# Patient Record
Sex: MALE | Race: WHITE | NOT HISPANIC OR LATINO | Employment: OTHER | URBAN - METROPOLITAN AREA
[De-identification: names, ages, dates, MRNs, and addresses within clinical notes are randomized per-mention and may not be internally consistent; named-entity substitution may affect disease eponyms.]

---

## 2017-02-27 ENCOUNTER — TRANSCRIBE ORDERS (OUTPATIENT)
Dept: ADMINISTRATIVE | Facility: HOSPITAL | Age: 62
End: 2017-02-27

## 2017-02-27 ENCOUNTER — HOSPITAL ENCOUNTER (OUTPATIENT)
Dept: RADIOLOGY | Facility: HOSPITAL | Age: 62
Discharge: HOME | End: 2017-02-27
Attending: FAMILY MEDICINE
Payer: COMMERCIAL

## 2017-02-27 DIAGNOSIS — M79.645 PAIN OF FINGER OF LEFT HAND: Primary | ICD-10-CM

## 2017-02-27 DIAGNOSIS — M79.645 PAIN OF FINGER OF LEFT HAND: ICD-10-CM

## 2017-02-27 PROCEDURE — 73140 X-RAY EXAM OF FINGER(S): CPT

## 2017-02-28 ENCOUNTER — ALLSCRIPTS OFFICE VISIT (OUTPATIENT)
Dept: OTHER | Facility: OTHER | Age: 62
End: 2017-02-28

## 2017-02-28 ENCOUNTER — GENERIC CONVERSION - ENCOUNTER (OUTPATIENT)
Dept: OTHER | Facility: OTHER | Age: 62
End: 2017-02-28

## 2017-03-24 ENCOUNTER — GENERIC CONVERSION - ENCOUNTER (OUTPATIENT)
Dept: OTHER | Facility: OTHER | Age: 62
End: 2017-03-24

## 2017-03-27 ENCOUNTER — ANESTHESIA EVENT (OUTPATIENT)
Dept: PERIOP | Facility: HOSPITAL | Age: 62
End: 2017-03-27
Payer: COMMERCIAL

## 2017-03-27 RX ORDER — CARVEDILOL 3.12 MG/1
3.12 TABLET ORAL 2 TIMES DAILY WITH MEALS
COMMUNITY
End: 2020-07-14 | Stop reason: ALTCHOICE

## 2017-03-28 ENCOUNTER — HOSPITAL ENCOUNTER (OUTPATIENT)
Facility: HOSPITAL | Age: 62
Setting detail: OUTPATIENT SURGERY
Discharge: HOME | End: 2017-03-28
Attending: ORTHOPAEDIC SURGERY | Admitting: ORTHOPAEDIC SURGERY
Payer: COMMERCIAL

## 2017-03-28 ENCOUNTER — ANESTHESIA (OUTPATIENT)
Dept: PERIOP | Facility: HOSPITAL | Age: 62
End: 2017-03-28
Payer: COMMERCIAL

## 2017-03-28 ENCOUNTER — HOSPITAL ENCOUNTER (OUTPATIENT)
Dept: RADIOLOGY | Facility: HOSPITAL | Age: 62
Setting detail: OUTPATIENT SURGERY
Discharge: HOME | End: 2017-03-28
Payer: COMMERCIAL

## 2017-03-28 VITALS
HEIGHT: 69 IN | WEIGHT: 225 LBS | RESPIRATION RATE: 16 BRPM | BODY MASS INDEX: 33.33 KG/M2 | HEART RATE: 68 BPM | DIASTOLIC BLOOD PRESSURE: 80 MMHG | TEMPERATURE: 96.4 F | SYSTOLIC BLOOD PRESSURE: 151 MMHG | OXYGEN SATURATION: 95 %

## 2017-03-28 DIAGNOSIS — S62.615A: ICD-10-CM

## 2017-03-28 DIAGNOSIS — S62.615D: ICD-10-CM

## 2017-03-28 PROBLEM — S62.618A: Status: ACTIVE | Noted: 2017-03-28

## 2017-03-28 LAB
GLUCOSE SERPL-MCNC: 104 MG/DL (ref 65–140)
GLUCOSE SERPL-MCNC: 91 MG/DL (ref 65–140)

## 2017-03-28 PROCEDURE — C1713 ANCHOR/SCREW BN/BN,TIS/BN: HCPCS | Performed by: ORTHOPAEDIC SURGERY

## 2017-03-28 PROCEDURE — 82948 REAGENT STRIP/BLOOD GLUCOSE: CPT

## 2017-03-28 PROCEDURE — 73140 X-RAY EXAM OF FINGER(S): CPT

## 2017-03-28 DEVICE — 1.5MM CORTEX SCREW SLF-TPNG WITH T4 STARDRIVE RECESS 6MM: Type: IMPLANTABLE DEVICE | Site: FINGER | Status: FUNCTIONAL

## 2017-03-28 DEVICE — 1.5MM VAL STRUT PLATE 8 HOLES-OBLIQUE-LEFT: Type: IMPLANTABLE DEVICE | Site: FINGER | Status: FUNCTIONAL

## 2017-03-28 DEVICE — 1.5MM CORTEX SCREW SLF-TPNG WITH T4 STARDRIVE RECESS 8MM: Type: IMPLANTABLE DEVICE | Site: FINGER | Status: FUNCTIONAL

## 2017-03-28 RX ORDER — MIDAZOLAM HYDROCHLORIDE 1 MG/ML
INJECTION INTRAMUSCULAR; INTRAVENOUS AS NEEDED
Status: DISCONTINUED | OUTPATIENT
Start: 2017-03-28 | End: 2017-03-28 | Stop reason: SURG

## 2017-03-28 RX ORDER — EPHEDRINE SULFATE 50 MG/ML
INJECTION, SOLUTION INTRAVENOUS AS NEEDED
Status: DISCONTINUED | OUTPATIENT
Start: 2017-03-28 | End: 2017-03-28 | Stop reason: SURG

## 2017-03-28 RX ORDER — LIDOCAINE HYDROCHLORIDE 10 MG/ML
INJECTION, SOLUTION INFILTRATION; PERINEURAL AS NEEDED
Status: DISCONTINUED | OUTPATIENT
Start: 2017-03-28 | End: 2017-03-28 | Stop reason: SURG

## 2017-03-28 RX ORDER — OXYCODONE HYDROCHLORIDE 5 MG/1
10 TABLET ORAL EVERY 6 HOURS PRN
Qty: 30 TABLET | Refills: 0 | Status: SHIPPED | OUTPATIENT
Start: 2017-03-28 | End: 2017-04-07

## 2017-03-28 RX ORDER — SODIUM CHLORIDE, SODIUM LACTATE, POTASSIUM CHLORIDE, CALCIUM CHLORIDE 600; 310; 30; 20 MG/100ML; MG/100ML; MG/100ML; MG/100ML
125 INJECTION, SOLUTION INTRAVENOUS CONTINUOUS
Status: DISCONTINUED | OUTPATIENT
Start: 2017-03-28 | End: 2017-03-28 | Stop reason: HOSPADM

## 2017-03-28 RX ORDER — FENTANYL CITRATE 50 UG/ML
INJECTION, SOLUTION INTRAMUSCULAR; INTRAVENOUS AS NEEDED
Status: DISCONTINUED | OUTPATIENT
Start: 2017-03-28 | End: 2017-03-28 | Stop reason: SURG

## 2017-03-28 RX ORDER — FENTANYL CITRATE/PF 50 MCG/ML
25 SYRINGE (ML) INJECTION
Status: DISCONTINUED | OUTPATIENT
Start: 2017-03-28 | End: 2017-03-28 | Stop reason: HOSPADM

## 2017-03-28 RX ORDER — OXYCODONE HYDROCHLORIDE AND ACETAMINOPHEN 5; 325 MG/1; MG/1
2 TABLET ORAL EVERY 4 HOURS PRN
Status: DISCONTINUED | OUTPATIENT
Start: 2017-03-28 | End: 2017-03-28 | Stop reason: HOSPADM

## 2017-03-28 RX ORDER — PROPOFOL 10 MG/ML
INJECTION, EMULSION INTRAVENOUS AS NEEDED
Status: DISCONTINUED | OUTPATIENT
Start: 2017-03-28 | End: 2017-03-28 | Stop reason: SURG

## 2017-03-28 RX ORDER — NAPROXEN 500 MG/1
500 TABLET ORAL EVERY 12 HOURS PRN
COMMUNITY
End: 2020-07-08 | Stop reason: ALTCHOICE

## 2017-03-28 RX ORDER — OXYCODONE HYDROCHLORIDE 5 MG/1
5 TABLET ORAL EVERY 4 HOURS PRN
Status: ON HOLD | COMMUNITY
End: 2017-03-28

## 2017-03-28 RX ORDER — ONDANSETRON 2 MG/ML
INJECTION INTRAMUSCULAR; INTRAVENOUS AS NEEDED
Status: DISCONTINUED | OUTPATIENT
Start: 2017-03-28 | End: 2017-03-28 | Stop reason: SURG

## 2017-03-28 RX ORDER — MAGNESIUM HYDROXIDE 1200 MG/15ML
LIQUID ORAL AS NEEDED
Status: DISCONTINUED | OUTPATIENT
Start: 2017-03-28 | End: 2017-03-28 | Stop reason: HOSPADM

## 2017-03-28 RX ORDER — KETOROLAC TROMETHAMINE 30 MG/ML
INJECTION, SOLUTION INTRAMUSCULAR; INTRAVENOUS AS NEEDED
Status: DISCONTINUED | OUTPATIENT
Start: 2017-03-28 | End: 2017-03-28 | Stop reason: SURG

## 2017-03-28 RX ADMIN — SODIUM CHLORIDE, SODIUM LACTATE, POTASSIUM CHLORIDE, AND CALCIUM CHLORIDE 125 ML/HR: .6; .31; .03; .02 INJECTION, SOLUTION INTRAVENOUS at 12:27

## 2017-03-28 RX ADMIN — PROPOFOL 200 MG: 10 INJECTION, EMULSION INTRAVENOUS at 14:39

## 2017-03-28 RX ADMIN — ONDANSETRON 4 MG: 2 INJECTION INTRAMUSCULAR; INTRAVENOUS at 15:19

## 2017-03-28 RX ADMIN — FENTANYL CITRATE 50 MCG: 50 INJECTION, SOLUTION INTRAMUSCULAR; INTRAVENOUS at 15:31

## 2017-03-28 RX ADMIN — KETOROLAC TROMETHAMINE 30 MG: 30 INJECTION, SOLUTION INTRAMUSCULAR at 15:56

## 2017-03-28 RX ADMIN — LIDOCAINE HYDROCHLORIDE 100 MG: 10 INJECTION, SOLUTION INFILTRATION; PERINEURAL at 14:39

## 2017-03-28 RX ADMIN — EPHEDRINE SULFATE 5 MG: 50 INJECTION, SOLUTION INTRAMUSCULAR; INTRAVENOUS; SUBCUTANEOUS at 14:58

## 2017-03-28 RX ADMIN — EPHEDRINE SULFATE 5 MG: 50 INJECTION, SOLUTION INTRAMUSCULAR; INTRAVENOUS; SUBCUTANEOUS at 15:01

## 2017-03-28 RX ADMIN — FENTANYL CITRATE 50 MCG: 50 INJECTION, SOLUTION INTRAMUSCULAR; INTRAVENOUS at 14:43

## 2017-03-28 RX ADMIN — SODIUM CHLORIDE, SODIUM LACTATE, POTASSIUM CHLORIDE, AND CALCIUM CHLORIDE: .6; .31; .03; .02 INJECTION, SOLUTION INTRAVENOUS at 16:38

## 2017-03-28 RX ADMIN — MIDAZOLAM HYDROCHLORIDE 2 MG: 1 INJECTION, SOLUTION INTRAMUSCULAR; INTRAVENOUS at 14:32

## 2017-03-28 RX ADMIN — EPHEDRINE SULFATE 5 MG: 50 INJECTION, SOLUTION INTRAMUSCULAR; INTRAVENOUS; SUBCUTANEOUS at 15:04

## 2017-04-03 ENCOUNTER — TRANSCRIBE ORDERS (OUTPATIENT)
Dept: ADMINISTRATIVE | Facility: HOSPITAL | Age: 62
End: 2017-04-03

## 2017-04-03 ENCOUNTER — HOSPITAL ENCOUNTER (OUTPATIENT)
Dept: RADIOLOGY | Facility: HOSPITAL | Age: 62
Discharge: HOME | End: 2017-04-03
Attending: PODIATRIST
Payer: COMMERCIAL

## 2017-04-03 ENCOUNTER — LAB REQUISITION (OUTPATIENT)
Dept: LAB | Facility: HOSPITAL | Age: 62
End: 2017-04-03
Payer: COMMERCIAL

## 2017-04-03 ENCOUNTER — APPOINTMENT (OUTPATIENT)
Dept: OCCUPATIONAL THERAPY | Facility: CLINIC | Age: 62
End: 2017-04-03
Payer: COMMERCIAL

## 2017-04-03 DIAGNOSIS — L97.522 NON-PRESSURE CHRONIC ULCER OF OTHER PART OF LEFT FOOT WITH FAT LAYER EXPOSED (HCC): ICD-10-CM

## 2017-04-03 DIAGNOSIS — S62.615A: ICD-10-CM

## 2017-04-03 DIAGNOSIS — L03.032 CELLULITIS OF TOE OF LEFT FOOT: ICD-10-CM

## 2017-04-03 DIAGNOSIS — L97.529 TOE ULCER, LEFT, WITH UNSPECIFIED SEVERITY (HCC): Primary | ICD-10-CM

## 2017-04-03 DIAGNOSIS — L97.529 TOE ULCER, LEFT, WITH UNSPECIFIED SEVERITY (HCC): ICD-10-CM

## 2017-04-03 PROCEDURE — 97165 OT EVAL LOW COMPLEX 30 MIN: CPT

## 2017-04-03 PROCEDURE — 73660 X-RAY EXAM OF TOE(S): CPT

## 2017-04-03 PROCEDURE — G8990 OTHER PT/OT CURRENT STATUS: HCPCS

## 2017-04-03 PROCEDURE — 87070 CULTURE OTHR SPECIMN AEROBIC: CPT | Performed by: PODIATRIST

## 2017-04-03 PROCEDURE — 97110 THERAPEUTIC EXERCISES: CPT

## 2017-04-03 PROCEDURE — 87205 SMEAR GRAM STAIN: CPT | Performed by: PODIATRIST

## 2017-04-03 PROCEDURE — G8991 OTHER PT/OT GOAL STATUS: HCPCS

## 2017-04-05 LAB
BACTERIA WND AEROBE CULT: NORMAL
GRAM STN SPEC: NORMAL

## 2017-04-10 ENCOUNTER — APPOINTMENT (OUTPATIENT)
Dept: OCCUPATIONAL THERAPY | Facility: CLINIC | Age: 62
End: 2017-04-10
Payer: COMMERCIAL

## 2017-04-10 ENCOUNTER — HOSPITAL ENCOUNTER (OUTPATIENT)
Dept: RADIOLOGY | Facility: CLINIC | Age: 62
Discharge: HOME | End: 2017-04-10
Payer: COMMERCIAL

## 2017-04-10 ENCOUNTER — ALLSCRIPTS OFFICE VISIT (OUTPATIENT)
Dept: OTHER | Facility: OTHER | Age: 62
End: 2017-04-10

## 2017-04-10 DIAGNOSIS — S62.615D: ICD-10-CM

## 2017-04-10 PROCEDURE — 73140 X-RAY EXAM OF FINGER(S): CPT

## 2017-04-12 ENCOUNTER — ALLSCRIPTS OFFICE VISIT (OUTPATIENT)
Dept: WOUND CARE | Facility: HOSPITAL | Age: 62
End: 2017-04-12
Attending: PODIATRIST
Payer: COMMERCIAL

## 2017-04-12 PROCEDURE — 99213 OFFICE O/P EST LOW 20 MIN: CPT | Performed by: PODIATRIST

## 2017-04-12 PROCEDURE — G0463 HOSPITAL OUTPT CLINIC VISIT: HCPCS | Performed by: PODIATRIST

## 2017-04-12 PROCEDURE — 11042 DBRDMT SUBQ TIS 1ST 20SQCM/<: CPT | Performed by: PODIATRIST

## 2017-04-17 ENCOUNTER — GENERIC CONVERSION - ENCOUNTER (OUTPATIENT)
Dept: OTHER | Facility: OTHER | Age: 62
End: 2017-04-17

## 2017-04-17 ENCOUNTER — HOSPITAL ENCOUNTER (OUTPATIENT)
Dept: RADIOLOGY | Facility: HOSPITAL | Age: 62
Discharge: HOME | End: 2017-04-17
Attending: PODIATRIST
Payer: COMMERCIAL

## 2017-04-17 ENCOUNTER — HOSPITAL ENCOUNTER (OUTPATIENT)
Dept: NON INVASIVE DIAGNOSTICS | Facility: HOSPITAL | Age: 62
Discharge: HOME | End: 2017-04-17
Attending: PODIATRIST
Payer: COMMERCIAL

## 2017-04-17 ENCOUNTER — APPOINTMENT (OUTPATIENT)
Dept: LAB | Facility: HOSPITAL | Age: 62
End: 2017-04-17
Attending: PODIATRIST
Payer: COMMERCIAL

## 2017-04-17 ENCOUNTER — APPOINTMENT (OUTPATIENT)
Dept: PREADMISSION TESTING | Facility: HOSPITAL | Age: 62
End: 2017-04-17
Payer: COMMERCIAL

## 2017-04-17 VITALS
WEIGHT: 225 LBS | HEART RATE: 69 BPM | BODY MASS INDEX: 33.33 KG/M2 | DIASTOLIC BLOOD PRESSURE: 87 MMHG | HEIGHT: 69 IN | SYSTOLIC BLOOD PRESSURE: 137 MMHG

## 2017-04-17 DIAGNOSIS — L97.529 TOE ULCER, LEFT, WITH UNSPECIFIED SEVERITY (HCC): ICD-10-CM

## 2017-04-17 LAB
ANION GAP SERPL CALCULATED.3IONS-SCNC: 9 MMOL/L (ref 4–13)
ATRIAL RATE: 67 BPM
BASOPHILS # BLD AUTO: 0.03 THOUSANDS/ΜL (ref 0–0.1)
BASOPHILS NFR BLD AUTO: 0 % (ref 0–1)
BUN SERPL-MCNC: 18 MG/DL (ref 5–25)
CALCIUM SERPL-MCNC: 9.6 MG/DL (ref 8.3–10.1)
CHLORIDE SERPL-SCNC: 103 MMOL/L (ref 100–108)
CO2 SERPL-SCNC: 27 MMOL/L (ref 21–32)
CREAT SERPL-MCNC: 0.98 MG/DL (ref 0.6–1.3)
EOSINOPHIL # BLD AUTO: 0.41 THOUSAND/ΜL (ref 0–0.61)
EOSINOPHIL NFR BLD AUTO: 5 % (ref 0–6)
ERYTHROCYTE [DISTWIDTH] IN BLOOD BY AUTOMATED COUNT: 14.3 % (ref 11.6–15.1)
GFR SERPL CREATININE-BSD FRML MDRD: >60 ML/MIN/1.73SQ M
GLUCOSE SERPL-MCNC: 103 MG/DL (ref 65–140)
HCT VFR BLD AUTO: 42.2 % (ref 36.5–49.3)
HGB BLD-MCNC: 14.2 G/DL (ref 12–17)
LYMPHOCYTES # BLD AUTO: 2.16 THOUSANDS/ΜL (ref 0.6–4.47)
LYMPHOCYTES NFR BLD AUTO: 29 % (ref 14–44)
MCH RBC QN AUTO: 32.1 PG (ref 26.8–34.3)
MCHC RBC AUTO-ENTMCNC: 33.6 G/DL (ref 31.4–37.4)
MCV RBC AUTO: 96 FL (ref 82–98)
MONOCYTES # BLD AUTO: 0.71 THOUSAND/ΜL (ref 0.17–1.22)
MONOCYTES NFR BLD AUTO: 9 % (ref 4–12)
NEUTROPHILS # BLD AUTO: 4.26 THOUSANDS/ΜL (ref 1.85–7.62)
NEUTS SEG NFR BLD AUTO: 57 % (ref 43–75)
P AXIS: 42 DEGREES
PLATELET # BLD AUTO: 150 THOUSANDS/UL (ref 149–390)
PMV BLD AUTO: 9.4 FL (ref 8.9–12.7)
POTASSIUM SERPL-SCNC: 4.3 MMOL/L (ref 3.5–5.3)
PR INTERVAL: 174 MS
QRS AXIS: -5 DEGREES
QRSD INTERVAL: 166 MS
QT INTERVAL: 430 MS
QTC INTERVAL: 454 MS
RBC # BLD AUTO: 4.42 MILLION/UL (ref 3.88–5.62)
SODIUM SERPL-SCNC: 139 MMOL/L (ref 136–145)
T WAVE AXIS: 147 DEGREES
VENTRICULAR RATE: 67 BPM
WBC # BLD AUTO: 7.57 THOUSAND/UL (ref 4.31–10.16)

## 2017-04-17 PROCEDURE — 80048 BASIC METABOLIC PNL TOTAL CA: CPT

## 2017-04-17 PROCEDURE — 85025 COMPLETE CBC W/AUTO DIFF WBC: CPT

## 2017-04-17 PROCEDURE — 93005 ELECTROCARDIOGRAM TRACING: CPT

## 2017-04-17 PROCEDURE — 71020 HB CHEST X-RAY 2VW FRONTAL&LATL: CPT

## 2017-04-17 PROCEDURE — 36415 COLL VENOUS BLD VENIPUNCTURE: CPT

## 2017-04-17 RX ORDER — OMEPRAZOLE 20 MG/1
20 CAPSULE, DELAYED RELEASE ORAL DAILY
COMMUNITY
End: 2020-07-08 | Stop reason: ALTCHOICE

## 2017-04-17 RX ORDER — BIOTIN 1 MG
TABLET ORAL
COMMUNITY

## 2017-04-25 ENCOUNTER — ANESTHESIA EVENT (OUTPATIENT)
Dept: PERIOP | Facility: HOSPITAL | Age: 62
End: 2017-04-25
Payer: COMMERCIAL

## 2017-04-25 ENCOUNTER — APPOINTMENT (OUTPATIENT)
Dept: RADIOLOGY | Facility: HOSPITAL | Age: 62
End: 2017-04-25
Payer: COMMERCIAL

## 2017-04-25 ENCOUNTER — HOSPITAL ENCOUNTER (OUTPATIENT)
Facility: HOSPITAL | Age: 62
Setting detail: OUTPATIENT SURGERY
Discharge: HOME | End: 2017-04-25
Attending: PODIATRIST | Admitting: PODIATRIST
Payer: COMMERCIAL

## 2017-04-25 ENCOUNTER — ANESTHESIA (OUTPATIENT)
Dept: PERIOP | Facility: HOSPITAL | Age: 62
End: 2017-04-25
Payer: COMMERCIAL

## 2017-04-25 VITALS
RESPIRATION RATE: 20 BRPM | DIASTOLIC BLOOD PRESSURE: 66 MMHG | TEMPERATURE: 96 F | WEIGHT: 225 LBS | OXYGEN SATURATION: 96 % | HEIGHT: 69 IN | SYSTOLIC BLOOD PRESSURE: 130 MMHG | BODY MASS INDEX: 33.33 KG/M2 | HEART RATE: 57 BPM

## 2017-04-25 DIAGNOSIS — M86.172 OTHER ACUTE OSTEOMYELITIS, LEFT ANKLE AND FOOT (HCC): ICD-10-CM

## 2017-04-25 DIAGNOSIS — E11.40 TYPE 2 DIABETES MELLITUS WITH DIABETIC NEUROPATHY (HCC): ICD-10-CM

## 2017-04-25 PROCEDURE — 87176 TISSUE HOMOGENIZATION CULTR: CPT | Performed by: PODIATRIST

## 2017-04-25 PROCEDURE — 88305 TISSUE EXAM BY PATHOLOGIST: CPT | Performed by: PODIATRIST

## 2017-04-25 PROCEDURE — 87075 CULTR BACTERIA EXCEPT BLOOD: CPT | Performed by: PODIATRIST

## 2017-04-25 PROCEDURE — 87070 CULTURE OTHR SPECIMN AEROBIC: CPT | Performed by: PODIATRIST

## 2017-04-25 PROCEDURE — 87205 SMEAR GRAM STAIN: CPT | Performed by: PODIATRIST

## 2017-04-25 PROCEDURE — 73630 X-RAY EXAM OF FOOT: CPT

## 2017-04-25 PROCEDURE — 88311 DECALCIFY TISSUE: CPT | Performed by: PODIATRIST

## 2017-04-25 RX ORDER — PROPOFOL 10 MG/ML
INJECTION, EMULSION INTRAVENOUS CONTINUOUS PRN
Status: DISCONTINUED | OUTPATIENT
Start: 2017-04-25 | End: 2017-04-25 | Stop reason: SURG

## 2017-04-25 RX ORDER — FENTANYL CITRATE 50 UG/ML
INJECTION, SOLUTION INTRAMUSCULAR; INTRAVENOUS AS NEEDED
Status: DISCONTINUED | OUTPATIENT
Start: 2017-04-25 | End: 2017-04-25 | Stop reason: SURG

## 2017-04-25 RX ORDER — PROPOFOL 10 MG/ML
INJECTION, EMULSION INTRAVENOUS AS NEEDED
Status: DISCONTINUED | OUTPATIENT
Start: 2017-04-25 | End: 2017-04-25 | Stop reason: SURG

## 2017-04-25 RX ORDER — SODIUM CHLORIDE, SODIUM LACTATE, POTASSIUM CHLORIDE, CALCIUM CHLORIDE 600; 310; 30; 20 MG/100ML; MG/100ML; MG/100ML; MG/100ML
20 INJECTION, SOLUTION INTRAVENOUS CONTINUOUS
Status: DISCONTINUED | OUTPATIENT
Start: 2017-04-25 | End: 2017-04-25 | Stop reason: HOSPADM

## 2017-04-25 RX ORDER — CLINDAMYCIN PHOSPHATE 900 MG/50ML
900 INJECTION INTRAVENOUS ONCE
Status: COMPLETED | OUTPATIENT
Start: 2017-04-25 | End: 2017-04-25

## 2017-04-25 RX ORDER — KETAMINE HYDROCHLORIDE 50 MG/ML
INJECTION, SOLUTION, CONCENTRATE INTRAMUSCULAR; INTRAVENOUS AS NEEDED
Status: DISCONTINUED | OUTPATIENT
Start: 2017-04-25 | End: 2017-04-25 | Stop reason: SURG

## 2017-04-25 RX ORDER — FENTANYL CITRATE/PF 50 MCG/ML
25 SYRINGE (ML) INJECTION
Status: DISCONTINUED | OUTPATIENT
Start: 2017-04-25 | End: 2017-04-25 | Stop reason: HOSPADM

## 2017-04-25 RX ORDER — ONDANSETRON 2 MG/ML
4 INJECTION INTRAMUSCULAR; INTRAVENOUS ONCE
Status: DISCONTINUED | OUTPATIENT
Start: 2017-04-25 | End: 2017-04-25 | Stop reason: HOSPADM

## 2017-04-25 RX ORDER — OXYCODONE HYDROCHLORIDE AND ACETAMINOPHEN 5; 325 MG/1; MG/1
1 TABLET ORAL EVERY 4 HOURS PRN
Qty: 12 TABLET | Refills: 0 | Status: SHIPPED | OUTPATIENT
Start: 2017-04-25 | End: 2017-05-05

## 2017-04-25 RX ORDER — OXYCODONE HYDROCHLORIDE 5 MG/1
5 TABLET ORAL EVERY 4 HOURS PRN
Status: DISCONTINUED | OUTPATIENT
Start: 2017-04-25 | End: 2017-04-25 | Stop reason: HOSPADM

## 2017-04-25 RX ORDER — ONDANSETRON 2 MG/ML
INJECTION INTRAMUSCULAR; INTRAVENOUS AS NEEDED
Status: DISCONTINUED | OUTPATIENT
Start: 2017-04-25 | End: 2017-04-25 | Stop reason: SURG

## 2017-04-25 RX ORDER — KETOROLAC TROMETHAMINE 30 MG/ML
INJECTION, SOLUTION INTRAMUSCULAR; INTRAVENOUS AS NEEDED
Status: DISCONTINUED | OUTPATIENT
Start: 2017-04-25 | End: 2017-04-25 | Stop reason: SURG

## 2017-04-25 RX ORDER — MIDAZOLAM HYDROCHLORIDE 1 MG/ML
INJECTION INTRAMUSCULAR; INTRAVENOUS AS NEEDED
Status: DISCONTINUED | OUTPATIENT
Start: 2017-04-25 | End: 2017-04-25 | Stop reason: SURG

## 2017-04-25 RX ORDER — ALBUTEROL SULFATE 2.5 MG/3ML
2.5 SOLUTION RESPIRATORY (INHALATION) ONCE
Status: COMPLETED | OUTPATIENT
Start: 2017-04-25 | End: 2017-04-25

## 2017-04-25 RX ADMIN — SODIUM CHLORIDE, SODIUM LACTATE, POTASSIUM CHLORIDE, AND CALCIUM CHLORIDE 20 ML/HR: .6; .31; .03; .02 INJECTION, SOLUTION INTRAVENOUS at 07:22

## 2017-04-25 RX ADMIN — KETAMINE HYDROCHLORIDE 20 MG: 50 INJECTION INTRAMUSCULAR; INTRAVENOUS at 08:05

## 2017-04-25 RX ADMIN — ALBUTEROL SULFATE 2.5 MG: 2.5 SOLUTION RESPIRATORY (INHALATION) at 08:56

## 2017-04-25 RX ADMIN — MIDAZOLAM HYDROCHLORIDE 4 MG: 1 INJECTION, SOLUTION INTRAMUSCULAR; INTRAVENOUS at 08:05

## 2017-04-25 RX ADMIN — KETOROLAC TROMETHAMINE 15 MG: 30 INJECTION, SOLUTION INTRAMUSCULAR at 08:20

## 2017-04-25 RX ADMIN — FENTANYL CITRATE 100 MCG: 50 INJECTION, SOLUTION INTRAMUSCULAR; INTRAVENOUS at 08:05

## 2017-04-25 RX ADMIN — CLINDAMYCIN PHOSPHATE 900 MG: 18 INJECTION, SOLUTION INTRAMUSCULAR; INTRAVENOUS at 08:00

## 2017-04-25 RX ADMIN — ONDANSETRON 4 MG: 2 INJECTION INTRAMUSCULAR; INTRAVENOUS at 08:10

## 2017-04-25 RX ADMIN — PROPOFOL 60 MCG/KG/MIN: 10 INJECTION, EMULSION INTRAVENOUS at 08:05

## 2017-04-25 RX ADMIN — SODIUM CHLORIDE, SODIUM LACTATE, POTASSIUM CHLORIDE, AND CALCIUM CHLORIDE: .6; .31; .03; .02 INJECTION, SOLUTION INTRAVENOUS at 07:53

## 2017-04-25 RX ADMIN — PROPOFOL 30 MG: 10 INJECTION, EMULSION INTRAVENOUS at 08:05

## 2017-04-28 LAB
BACTERIA SPEC ANAEROBE CULT: NO GROWTH
BACTERIA TISS AEROBE CULT: NO GROWTH
BACTERIA WND AEROBE CULT: NO GROWTH
GRAM STN SPEC: NORMAL
GRAM STN SPEC: NORMAL

## 2017-06-28 ENCOUNTER — ALLSCRIPTS OFFICE VISIT (OUTPATIENT)
Dept: OTHER | Facility: OTHER | Age: 62
End: 2017-06-28

## 2017-10-23 ENCOUNTER — GENERIC CONVERSION - ENCOUNTER (OUTPATIENT)
Dept: OTHER | Facility: OTHER | Age: 62
End: 2017-10-23

## 2017-10-24 ENCOUNTER — GENERIC CONVERSION - ENCOUNTER (OUTPATIENT)
Dept: OTHER | Facility: OTHER | Age: 62
End: 2017-10-24

## 2018-01-13 VITALS
HEART RATE: 70 BPM | DIASTOLIC BLOOD PRESSURE: 84 MMHG | BODY MASS INDEX: 31.15 KG/M2 | WEIGHT: 230 LBS | SYSTOLIC BLOOD PRESSURE: 142 MMHG | HEIGHT: 72 IN

## 2018-01-13 VITALS
BODY MASS INDEX: 31.36 KG/M2 | WEIGHT: 231.5 LBS | HEIGHT: 72 IN | DIASTOLIC BLOOD PRESSURE: 82 MMHG | HEART RATE: 67 BPM | SYSTOLIC BLOOD PRESSURE: 138 MMHG

## 2018-01-17 NOTE — PROGRESS NOTES
Preliminary Nursing Report                Patient Information    Initial Encounter Entry Date:   3/24/2017 11:33 AM EST (Automated Transmission Automated Transmission)       Last Modified:   {Adrián Wilkins}              Legal Name: Allison Huntley        Social Security Number:        YOB: 1955        Age (years): 59        Gender: M        Body Mass Index (BMI): 31 kg/m2        Height: 72 in  Weight: 227 lbs (103 kgs)           Address:   [place] [address]  [id number] [place]              Phone: [phone]   (consent to leave messages)        Email:        Ethnicity: Decline to State        [employer]:        Marital Status:        Preferred Language: English        Race: Other Race                    Patient Insurance Information        Primary Insurance Information Carrier Name: {Primary  CarrierName}           Carrier Address:   {Primary  CarrierAddress}              Carrier Phone: {Primary  CarrierPhone}          Group Number: {Primary  GroupNumber}          Policy Number: {Primary  PolicyNumber}          Insured Name: {Primary  InsuredName}          Insured : {Primary  InsuredDOB}          Relationship to Insured: {Primary  RelationshiptoInsured}           Secondary Insurance Information Carrier Name: {Secondary  CarrierName}           Carrier Address:   {Secondary  CarrierAddress}              Carrier Phone: {Secondary  CarrierPhone}          Group Number: {Secondary  GroupNumber}          Policy Number: {Secondary  PolicyNumber}          Insured Name: {Secondary  InsuredName}          Insured : {Secondary  InsuredDOB}          Relationship to Insured: {Secondary  RelationshiptoInsured}                       Health Profile   Booking #:   Sulema Mann #: [id number]               DOS: 2017    Surgery : REPAIR FINGER FRACTURE, EACH    Add'l Procedures/Notes:     Surgery Risk: Minor          Precautions     Chest pain       Dilated cardiomyopathy       Nonischemic cardiomyopathy                   Allergies    No Known Environmental Allergies       No Known Food Allergies       Clinical Comments: Reaction Type: , Reaction: , Severity:        Robaxin TABS       Clinical Comments: Reaction Type: , Reaction: , Severity:              Medications    Amitriptyline HCl - 100 MG Oral Tablet       Aspirin 81 MG TABS       Carvedilol 3 125 MG Oral Tablet       Clotrimazole-Betamethasone 1-0 05 % External Cream       Diclofenac Potassium 50 MG Oral Tablet       Folic Acid 1 MG Oral Tablet       Levothyroxine Sodium 50 MCG Oral Tablet       Lisinopril 20 MG Oral Tablet       MetFORMIN HCl ER (OSM) 1000 MG Oral Tablet Extended Release 24 Hour       Naproxen 500 MG Oral Tablet       Omeprazole 20 MG CPCR       Simvastatin 10 MG Oral Tablet       Vitamin B12 1000 MCG Oral Tablet Extended Release       Vitamin D 2000 UNIT Oral Tablet               Conditions    Abnormal ECG       Abnormal stress test       Benign essential hypertension       Bursitis of right hip       Chest pain       Chronic pain syndrome       Closed displaced fracture of proximal phalanx of left ring finger, initial encounter       Deep Pain In The Right Hip       Dilated cardiomyopathy       Hyperlipemia       Lower back pain       Nonischemic cardiomyopathy       Pain in joint of right hip       Right knee pain       Sacral Radiculopathy At S1       Spinal stenosis       Spondylosis of 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               Family History    None             Surgical History    None             Social History    Alcohol drinker       Current Every Day Smoker                               Patient Instructions       Medical Procedure Risk  NPO Instructions   The day before surgery it is recommended to have a light dinner at your usual time and you are allowed a light snack early in the evening   Do not eat anything heavy or eat a big meal after 7pm  Do not eat or drink anything after midnight prior to your surgery  If you are supposed to take any of your medications, do so with a sip of water  Failure to follow these instructions can lead to an increased risk of lung complications and may result in a delay or cancellation of your procedure  If you have any questions, contact your institution for further instructions  No candy, no gum, no mints, no chewing tobacco          Lisinopril 20 MG Oral Tablet  Medication Instruction (ACE/ARB - Blood Pressure Medication) 1  Please continue the following medications up to the evening before surgery, but do not take it on the day of surgery  Please restart your medications as soon as clinically feasible  Aspirin 81 MG TABS  Medication Instruction (Aspirin - Blood Thinners) 112, 104, 105, 114, 113, 103, 110, 107, 108, 109, 111, 106  Please continue to take this medication on your normal schedule  If this is an oral medication and you take in the morning, you may do so with a sip of water  However, your surgeon may have you stop aspirin up to a week early if you are having intracranial, middle ear, posterior eye, spine surgery or prostate surgery  Carvedilol 3 125 MG Oral Tablet  Medication Instruction (Beta Blocker) 3, 2, c, 4, 6, 5  Please continue to take this medication on your normal schedule  If this is an oral medication and you take in the morning, you may do so with a sip of water  MetFORMIN HCl ER (OSM) 1000 MG Oral Tablet Extended Release 24 Hour  Medication Instruction (Diabetic Medication)   Please decrease your morning insulin dose to one-half of normal dose  If you are taking oral diabetes medications, the morning dose should be omitted  If taking metformin (Glucophage), discontinue the medication for 24 hours prior to surgery  If you have an insulin pump, continue at a basal rate only           Diclofenac Potassium 50 MG Oral Tablet, Naproxen 500 MG Oral Tablet  Medication Instruction (NSAID - Pain Medication) 61  Please stop ibuprofen, naproxen and other non-steroidal anti-inflammatory drugs (NSAIDS) for 24 hrs before surgery  Simvastatin 10 MG Oral Tablet  Medication Instruction (Cholesterol Medication) 81, 82  Please continue to take this medication on your normal schedule  If this is an oral medication and you take in the morning, you may do so with a sip of water  Amitriptyline HCl - 100 MG Oral Tablet  Medication Instruction (TCA - Antidepressants) 87, 88  Please continue to take this medication on your normal schedule  If this is an oral medication and you take in the morning, you may do so with a sip of water  Current Every Day Smoker  Smoking Cessation   Smoking before and after surgery can lead to complications  Patients who quit smoking at least eight weeks before surgery have complication rates almost as low as non-smokers  Smokers who can stop smoking 24 or 48 hours before surgery may also benefit from decreased amounts of nicotine and carbon monoxide in the body  For help quitting smoking, speak with your physician or contact the [address] [place] [address] or [employer]  Please visit the following web address for assistance with quitting  [employer] be  [email or web address]  aspx  Testing Considerations       No recommendations for this classification  Consultations       ? Cardiac Consult (Major CHF)   If the patient is having increasing difficulty breathing or fluid retention then a cardiac consult is indicated  Otherwise, optimization of medical therapy is recommended under the direction of the PCP or Cardiologist   Triggered by: Nonischemic cardiomyopathy, Dilated cardiomyopathy         ? Cardiac Consult (Major MI) c  If the patient has had a Myocardial Infarction within 30 days then a cardiac consult is indicated   Also, if the patient is having increasing frequency or intensity of chest pain then a cardiac consult is indicated  Otherwise, optimization of medical therapy is recommended under the direction of the PCP or cardiologist   Triggered by: Chest pain               Miscellaneous Questions         Question: Are you able to walk up a flight of stairs, walk up a hill or do heavy housework WITHOUT having chest pain or shortness of breath? Answer: YES                   Allergies/Conditions/Medications Not Found        The following were not recognized by our system when generating the recommendations  Please consider if this would impact any preoperative protocols  ? Abnormal stress test       ? Alcohol drinker       ? Deep Pain In The Right Hip       ? Sacral Radiculopathy At S1       ? Clotrimazole-Betamethasone 1-0 05 % External Cream       ? Folic Acid 1 MG Oral Tablet       ? Levothyroxine Sodium 50 MCG Oral Tablet       ? Omeprazole 20 MG CPCR       ? Vitamin B12 1000 MCG Oral Tablet Extended Release                  Appointment Information         Date:    03/28/2017        Location:    [place]        Address:           Directions:                      Footnotes revision 14      ?? Denotes a free-text entry  Legal Disclaimer: Any and all recommendations and services provided herein are designed to assist in the preoperative care of the patient  Nothing contained herein is designed to replace, eliminate or alleviate the responsibility of the attending physician to supervise and determine the patient?s preoperative care and course of treatment  Failure to provide complete, accurate information may negatively impact the system?s ability to recommend the proper preoperative protocol  THE ATTENDING PHYSICIAN IS RESPONSIBLE TO REVIEW THE SUGGESTED PREOPERATIVE PROTOCOLS/COURSE OF TREATMENT AND PRESCRIBE THE FINAL COURSE OF PREOPERATIVE TREATMENT IN CONSULTATION WITH THE PATIENT  THE [employer] SYSTEM AND ITS MATERIALS ARE PROVIDED ? AS IS? WITHOUT WARRANTY OF ANY KIND, EXPRESS OR IMPLIED, INCLUDING, BUT NOT LIMITED TO, WARRANTIES OF PERFORMANCE OR MERCHANTABILITY OR FITNESS FOR A PARTICULAR PURPOSE  PATIENT AND PHYSICIANS HEREBY AGREE THAT THEIR USE OF THE MATERIALS AND RESOURCES ACT AS A CONSENT TO RELEASE AND WAIVE ePREOP FROM ANY AND ALL CLAIMS OF WARRANTY, TORT OR CONTRACT LAW OF ANY KIND  Electronically signed Jarett CORREIA    Mar 24 2017 12:49PM EST

## 2018-01-22 VITALS
DIASTOLIC BLOOD PRESSURE: 74 MMHG | HEIGHT: 72 IN | SYSTOLIC BLOOD PRESSURE: 127 MMHG | WEIGHT: 227 LBS | HEART RATE: 80 BPM | BODY MASS INDEX: 30.75 KG/M2

## 2018-01-23 NOTE — PROGRESS NOTES
Assessment    1  Closed displaced fracture of proximal phalanx of left ring finger, initial encounter (816 01)   (P97 046T)    Plan  Closed displaced fracture of proximal phalanx of left ring finger, initial encounter    · 1 - Sophia Foley MD  (Orthopedic Surgery) Physician Referral  Consult Only: the  expectation is that the referring provider will communicate back to the patient on  treatment options  Evaluation and Treatment: the expectation is that the referred to  provider will communicate back to the patient on treatment options  Status: Active   Requested for: 03Avj0526  Care Summary provided  : Yes    Discussion/Summary    Findings consistent with left ring finger proximal phalanx fracture with rotational deformity  Discussed findings and treatment options with patient  Will schedule surgery with Dr Phyllis Pan to fix his finger  Contact Dr hPyllis Pan and agree to set up his surgery for 3/7/17  Chief Complaint  left ring finger injury      History of Present Illness  HPI: 58 yo white male with injury to his left ring finger 2/21/17  His left ring finger was caught by his dog leash and twisted  He is c/o pain of the left ring finger with difficulty moving  He also noticed deformity when trying to bend his finger  The ring finger is overlapping the little finger  Review of Systems    Constitutional: No fever or chills, feels well, no tiredness, no recent weight loss or weight gain  Eyes: No complaints of red eyes, no eyesight problems  ENT: no complaints of loss of hearing, no nosebleeds, no sore throat  Cardiovascular: No complaints of chest pain, no palpitations, no leg claudication or lower extremity edema  Respiratory: No complaints of shortness of breath, no wheezing, no cough  Gastrointestinal: No complaints of abdominal pain, no constipation, no nausea or vomiting, no diarrhea or bloody stools  Genitourinary: No complaints of dysuria or incontinence, no hesitancy, no nocturia  Musculoskeletal: as noted in HPI  Integumentary: No complaints of skin rash or lesion, no itching or dry skin, no skin wounds  Neurological: No complaints of headache, no confusion, no numbness or tingling, no dizziness  Psychiatric: No suicidal thoughts, no anxiety, no depression  Endocrine: No muscle weakness, no frequent urination, no excessive thirst, no feelings of weakness  ROS reviewed  Active Problems    1  Abnormal ECG (794 31) (R94 31)   2  Abnormal stress test (794 39) (R94 39)   3  Benign essential hypertension (401 1) (I10)   4  Bursitis of right hip (726 5) (M70 71)   5  Chest pain (786 50) (R07 9)   6  Chronic pain syndrome (338 4) (G89 4)   7  Deep Pain In The Right Hip (719 45)   8  Dilated cardiomyopathy (425 4) (I42 0)   9  Hyperlipemia (272 4) (E78 5)   10  Lower back pain (724 2) (M54 5)   11  Nonischemic cardiomyopathy (425 4) (I42 8)   12  Pain in joint of right hip (719 45) (M25 551)   13  Right knee pain (719 46) (M25 561)   14  Sacral Radiculopathy At S1 (724 4)   15  Spinal stenosis (724 00) (M48 00)   16  Spondylosis of 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 (721 3) (M47 816)    Past Medical History    · History of arthritis (V13 4) (Z87 39)   · History of deep venous thrombosis (V12 51) (Z86 718)   · History of type 2 diabetes mellitus (V12 29) (Z86 39)    The active problems and past medical history were reviewed and updated today  Surgical History    · History of Back Surgery   · History of Hernia Repair   · History of Knee Replacement   · History of Laryngotomy With Removal Of Tumor   · History of Total Hip Replacement    The surgical history was reviewed and updated today  Family History    The family history was reviewed and updated today  Social History    · Alcohol drinker (V49 89) (Z78 9)   · Current Every Day Smoker (305 1)  The social history was reviewed and updated today  Current Meds   1   Amitriptyline HCl - 100 MG Oral Tablet; TAKE 2 TABLETS AT BEDTIME; Therapy: (Recorded:71Ygx5695) to Recorded   2  Aspirin 81 MG TABS; Take 1 tablet daily; Therapy: (Noe Blancas) to Recorded   3  Carvedilol 3 125 MG Oral Tablet; TAKE ONE TABLET BY MOUTH TWICE DAILY WITH   MEALS; Therapy: 33WXQ8967 to (Keke Torres)  Requested for: 85KWD8111; Last   Rx:65Bcq9045 Ordered   4  Clotrimazole-Betamethasone 1-0 05 % External Cream;   Therapy: (Recorded:26Zdk2954) to Recorded   5  Diclofenac Potassium 50 MG Oral Tablet; Therapy: (Dionicio Salgado) to Recorded   6  Folic Acid 1 MG Oral Tablet; Take 1 tablet daily; Therapy: (Noe Blancas) to Recorded   7  Levothyroxine Sodium 50 MCG Oral Tablet; Therapy: 02CZH5808 to (Last XC:71NUC0729)  Requested for: 64MJR5532 Ordered   8  Lisinopril 20 MG Oral Tablet; TAKE 1 TABLET DAILY; Therapy: 95VBH6339 to (Evaluate:82Cfy6674)  Requested for: 26SAA0030 Recorded   9  MetFORMIN HCl ER (OSM) 1000 MG Oral Tablet Extended Release 24 Hour; take 2   tablet daily; Therapy: (Noe Blancas) to Recorded   10  Naproxen 500 MG Oral Tablet; TAKE 1 TABLET EVERY 12 HOURS AS NEEDED; Therapy: 27QVJ4500 to Recorded   11  Omeprazole 20 MG CPCR; TAKE 1 CAPSULE DAILY; Therapy: (Recorded:41Avj3333) to Recorded   12  Simvastatin 10 MG Oral Tablet; TAKE 1 TABLET DAILY AT BEDTIME; Therapy: (Recorded:79Mrj5820) to Recorded   13  Vitamin B12 1000 MCG Oral Tablet Extended Release; TAKE 2 TABLET Daily TDD:2,000    mg; Therapy: (Noe Blancas) to Recorded   14  Vitamin D 2000 UNIT Oral Tablet; take 2 tablet daily; Therapy: (Recorded:43Fik0406) to Recorded    The medication list was reviewed and updated today  Allergies    1  Robaxin TABS    2  No Known Environmental Allergies   3   No Known Food Allergies    Vitals   Recorded: 88UTN1114 03:10PM   Heart Rate 80   Systolic 070   Diastolic 74   Height 6 ft    Weight 227 lb    BMI Calculated 30 79   BSA Calculated 2 25     Physical Exam    Constitutional - General appearance: Normal    Musculoskeletal - Gait and station: Normal    Cardiovascular - Heart - RRR, no murmurs, no rubs, no gallops  Respiratory - Lungs - Clear to auscultation bilaterally, no rales, no rhonci, no wheezes  Neurologic - Sensation: Normal  Upper extremity peripheral neuro exam: Normal    Psychiatric - Orientation to person, place, and time: Normal  Mood and affect: Normal    Eyes   Conjunctiva and lids: Normal     Left Fourth Digit/Hand: Appearance: swelling of the 4th phalanx, swelling of the 4th PIP joint and rotational malalignment of the 4th digit (4th and proximal phalanx ), but no Boutonniere deformity of the 4th digit and no 4th digit Mallet deformity  Tenderness: 4th phalanx (4th and proximal phalanx )  PIP flexion: painful restricted AROM  PIP extension: normal AROM  Motor: Deferred  Results/Data  I personally reviewed the films/images/results in the office today  My interpretation follows  X-ray Review Left ring finger showed spiral proximal phalanx fracture  Surgery Scheduling Form  Surgery Schedule Form [facility] Standard:   Location: [place]   Confirmation Number:   PROCEDURE DETAILS   Procedure Date: 3/7/17   Requested Time:   Surgeon: Dr Meryl Sanderson   Co-Surgeon:   ALEYDA ELDER [facility] Required:   Bed: Out Patient - No Bed Required   Procedure: left ring finger ORIF   Laterality/Level: Left  Case Length:   Anticipated frozen section:   Anesthesia: Local     Procedure Codes:   Pre-op diagnosis: left closed displaced fracture of proximal phalanx of ring finger   Diagnosis Code(s): S62 615A   Equipment: C-Arm/Xray   Equipment Needs:   Implants:     Is the patient able to walk up a flight of stairs, walk up a hill or do heavy housework WITHOUT having chest pain or shortness of breath?    REGISTRATION & FINANCIAL CLEARANCE   FA Initials:   Insurance:   Policy Number: Group Number:     PRE-ADMISSION TESTING/CLINICAL INFORMATION   PAT Location: [facility] CONSULTS NEEDED:   Anesthesia Consult: No Anesthesia consult needed   Medical Consult: No Medical consult needed   Cardiac Consult: No Cardiac consult needed   Other Consult: No Other consult needed    ALLERGIES AND ALERTS     Latex Allergy: NO   Penicillin Allergy: NO   Malignant Hyperthermia: NO   Diabetic Patient: YES     COMMENTS   Scheduling Information Provided By:     CASE MANAGEMENT:      Future Appointments    Date/Time Provider Specialty Site   03/07/2017 02:45 PM MARC Frank  [address] [place] [address] OR   03/24/2017 08:50 AM MARC Frank   Orthopedic Surgery [address]     Signatures   Electronically signed by : Mo Castellano [facility]; Feb 28 2017  3:52PM EST                       (Author)    Electronically signed by : Cory Glasgow MD; Feb 28 2017  4:34PM EST                       (Author)

## 2018-10-15 LAB — HBA1C MFR BLD HPLC: 6.3 %

## 2019-01-08 ENCOUNTER — TELEPHONE (OUTPATIENT)
Dept: PAIN MEDICINE | Facility: MEDICAL CENTER | Age: 64
End: 2019-01-08

## 2019-01-08 NOTE — TELEPHONE ENCOUNTER
S/w patient and spouse, pt was last seen in [address] [place] 9/13/13 by ANDRZEJ    Bursitis Of The Right Hip   Chronic Pain Syndrome   Deep Pain In The Right Hip  Joint Pain In The Right Knee   Lower Back Pain Right  Lumbar Spondylosis     Per pt can hardly walk, has [facility]-PP[facility]  No recent imaging, if np pw is not rec'd by 1/11/19 spouse will come to [address]  office to  pw    Spoke with pharmacy requires prior authorization     Patient advised that they should receive the NP paperwork in the mail prior to their appointment  If they do not receive the paperwork; or if the appointment is within 3-7 days they can print it off the spine and pain website:  [employer] or Arrive 45 minutes prior to their appointment time, or retrieve it from the practice in advance to their appointment  It will take approximately 30 minutes to complete

## 2019-01-18 ENCOUNTER — OFFICE VISIT (OUTPATIENT)
Dept: PAIN MEDICINE | Facility: CLINIC | Age: 64
End: 2019-01-18
Payer: COMMERCIAL

## 2019-01-18 VITALS
HEIGHT: 69 IN | WEIGHT: 224 LBS | SYSTOLIC BLOOD PRESSURE: 160 MMHG | BODY MASS INDEX: 33.18 KG/M2 | HEART RATE: 94 BPM | DIASTOLIC BLOOD PRESSURE: 106 MMHG

## 2019-01-18 DIAGNOSIS — M46.1 SACROILIITIS (HCC): ICD-10-CM

## 2019-01-18 DIAGNOSIS — M25.562 CHRONIC PAIN OF LEFT KNEE: ICD-10-CM

## 2019-01-18 DIAGNOSIS — G89.29 CHRONIC PAIN OF LEFT KNEE: ICD-10-CM

## 2019-01-18 DIAGNOSIS — M54.16 LEFT LUMBAR RADICULITIS: ICD-10-CM

## 2019-01-18 DIAGNOSIS — M25.552 PAIN OF LEFT HIP JOINT: Primary | ICD-10-CM

## 2019-01-18 PROBLEM — M86.9 PYOGENIC INFLAMMATION OF BONE (HCC): Status: ACTIVE | Noted: 2017-04-12

## 2019-01-18 PROBLEM — I44.7 LBBB (LEFT BUNDLE BRANCH BLOCK): Status: ACTIVE | Noted: 2017-06-28

## 2019-01-18 PROBLEM — I25.10 ARTERIOSCLEROSIS OF CORONARY ARTERY: Status: ACTIVE | Noted: 2017-06-28

## 2019-01-18 PROBLEM — L97.522 SKIN ULCER OF TOE OF LEFT FOOT WITH FAT LAYER EXPOSED (HCC): Status: ACTIVE | Noted: 2017-04-12

## 2019-01-18 PROBLEM — E11.49 DIABETES MELLITUS TYPE 2 WITH NEUROLOGICAL MANIFESTATIONS (HCC): Status: ACTIVE | Noted: 2017-04-12

## 2019-01-18 PROCEDURE — 99204 OFFICE O/P NEW MOD 45 MIN: CPT | Performed by: ANESTHESIOLOGY

## 2019-01-18 RX ORDER — HYDROCODONE BITARTRATE AND ACETAMINOPHEN 5; 325 MG/1; MG/1
TABLET ORAL
COMMUNITY
Start: 2019-01-11 | End: 2019-06-14 | Stop reason: ALTCHOICE

## 2019-01-18 RX ORDER — DULOXETIN HYDROCHLORIDE 60 MG/1
CAPSULE, DELAYED RELEASE ORAL
COMMUNITY
Start: 2019-01-11

## 2019-01-18 RX ORDER — DICLOFENAC POTASSIUM 50 MG/1
TABLET, FILM COATED ORAL
COMMUNITY
End: 2020-12-02 | Stop reason: ALTCHOICE

## 2019-01-18 RX ORDER — PREDNISONE 20 MG/1
TABLET ORAL
COMMUNITY
Start: 2019-01-11 | End: 2019-01-24 | Stop reason: ALTCHOICE

## 2019-01-18 RX ORDER — CLOTRIMAZOLE AND BETAMETHASONE DIPROPIONATE 10; .64 MG/G; MG/G
CREAM TOPICAL
COMMUNITY

## 2019-01-18 NOTE — PROGRESS NOTES
Assessment:  1  Pain of left hip joint    2  Chronic pain of left knee    3  Sacroiliitis ([address] )    4  Left lumbar radiculitis        Plan:  Pleasant 77-year-old gentleman who we saw in the past approximately 5 years ago with chronic back hip and knee pain  I explained him that would I cannot inject into his hips and knee as he does have prior replacements  I did recommend him having an evaluation with Dr Humberto Altman, to see if he has any further treatment options  In regard to his chronic pain will have him undergo a course of physical therapy as a cost is too high will have him evaluated for home exercise program     Regards to his left-sided low back pain, it persists despite time, relative rest, activity modification and therapy  Based on the patient's symptoms and examination, I suspect that their pain is being generated by the sacroiliac joint  I will schedule the patient for intra-articular sacroiliac joint steroid injection under fluoroscopic guidance to address any inflammatory component of the pain  This would serve both diagnostic and hopefully therapeutic purposes  If the patient does not receive significant relief following the injection, it is possible that there is another pain source as the sacroiliac joint is a common pain referral site  It is also possible that the pain is being manifested by an extra-articular sacroiliac pain generator which would not be addressed with an intra-articular injection  Given the patient's history of diabetes, there may be an increased risk of infection in association with the procedure although the overall risk is low  In addition, following the injection there may be a transient elevation in serum glucose levels for several days  The patient understands that this may require additional glycemic coverage in coordination with the treating physician    In the office today, we reviewed the nature of the patient's pathology in depth using  diagrams and models  We discussed the approach I would use for the sacroiliac joint injection and provided literature for home review  The patient understands the risks associated with the procedure including bleeding, infection, tissue injury, allergic reaction and paralysis and provided written and verbal consent in the office today  This document utilizing voice recognition software and errors may have occurred  In addition with his physical exam I believe he has element of either stenosis or disc herniation as the patient states he cannot have an MRI will order CT of the lumbar spine  My impressions and treatment recommendations were discussed in detail with the patient who verbalized understanding and had no further questions  Discharge instructions were provided  I personally saw and examined the patient and I agree with the above discussed plan of care  Orders Placed This Encounter   Procedures    XR hip/pelv 2-3 vws left if performed     Standing Status:   Future     Standing Expiration Date:   1/18/2023     Scheduling Instructions:      Bring along any outside films relating to this procedure  Order Specific Question:   Reason for Exam:     Answer:   hip pain    XR knee 3 vw left non injury     Standing Status:   Future     Standing Expiration Date:   1/18/2023     Scheduling Instructions:      Bring along any outside films relating to this procedure  Order Specific Question:   Reason for Exam:     Answer:   knee pain    CT lumbar spine without contrast     Standing Status:   Future     Standing Expiration Date:   1/18/2023     Scheduling Instructions: There is no prep for this study  Please bring your insurance cards, a form of photo ID and a list of your medications with you  Arrive 15 minutes prior to your appointment time to register  On the day of your test, please bring any prior CT or MRI studies of this area with you that were not performed at a [facility]  To schedule this appointment, please contact Central Scheduling at [phone]  Order Specific Question:   What is the patient's sedation requirement? Answer:   No Sedation    FL spine and pain procedure     Standing Status:   Future     Standing Expiration Date:   1/18/2023     Order Specific Question:   Reason for Exam:     Answer:   Left SI Joint     Order Specific Question:   Anticoagulant hold needed? Answer:   no    Ambulatory referral to Orthopedic Surgery     Standing Status:   Future     Standing Expiration Date:   7/18/2019     Referral Priority:   Routine     Referral Type:   Consult - AMB     Referral Reason:   Specialty Services Required     Referred to Provider:   Laura Menchaca DO     Requested Specialty:   Orthopedic Surgery     Number of Visits Requested:   1     Expiration Date:   1/18/2020    Ambulatory referral to Physical Therapy     Standing Status:   Future     Standing Expiration Date:   7/18/2019     Referral Priority:   Routine     Referral Type:   Physical Therapy     Referral Reason:   Specialty Services Required     Requested Specialty:   Physical Therapy     Number of Visits Requested:   1     Expiration Date:   1/18/2020     New Medications Ordered This Visit   Medications    predniSONE 20 mg tablet    diclofenac potassium (CATAFLAM) 50 mg tablet     Sig: Take by mouth    HYDROcodone-acetaminophen (NORCO) 5-325 mg per tablet    clotrimazole-betamethasone (LOTRISONE) 1-0 05 % cream     Sig: Apply topically    DULoxetine (CYMBALTA) 60 mg delayed release capsule    Lidocaine-Menthol 4-5 % PTCH     Sig: Apply topically       History of Present Illness:    Jose A Navarro is a 70 y o  male who we have seen in the past approximately 5 years ago  He has chronic low back lower extremity pain with bilateral hip replacement left knee replacement  He has had prior lumbar surgery as well as history of blood clots  Recent ultrasound was negative  He has persistent pain and was re-evaluated by his orthopedic doctor approximately 2 months ago  He is currently on hydrocodone from his primary care physician, he is utilizing Lidoderm patches which are helping  His pain is moderate to severe rates it as 9/10 on the visual analog scale significant interfering with daily living activities  His pain is nearly constant worse in the evening describes sharp shooting pressure-like with a numbing sensation has subjective weakness of his lower limbs  Lying down relaxation decreases symptoms while most activities aggravate them  I have personally reviewed and/or updated the patient's past medical history, past surgical history, family history, social history, current medications, allergies, and vital signs today       Review of Systems:    Review of Systems    Patient Active Problem List   Diagnosis    Nonischemic cardiomyopathy ([facility] )    Closed displaced fracture of proximal phalanx of ring finger    Abnormal stress test    Benign essential hypertension    Arteriosclerosis of coronary artery    Chronic pain disorder    Diabetes mellitus type 2 with neurological manifestations ([facility] )    Hyperlipemia    LBBB (left bundle branch block)    Pyogenic inflammation of bone (HCC)    Pain in joint of right hip    Right knee pain    Thoracic or lumbosacral neuritis or radiculitis    Skin ulcer of toe of left foot with fat layer exposed ([facility] )    Spinal stenosis    Spondylosis of 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       Past Medical History:   Diagnosis Date    Arthritis     Colon polyps     Current every day smoker     Diabetes mellitus ([facility] )     type II    Drinks beer     DVT (deep venous thrombosis) ([facility] ) 2008    l leg    Full dentures     Hyperlipidemia     Hypertension     Vocal cord polyps        Past Surgical History:   Procedure Laterality Date    BACK SURGERY      COLONOSCOPY      HERNIA REPAIR Right     inguinal    IVC FILTER INSERTION      JOINT REPLACEMENT      l tkr and r thr    LARYNGOSCOPY      LUMBAR SPINE SURGERY      ME AMPUTATION TOE,I-P JT Left 4/25/2017    Procedure: 2ND TOE AMPUTATION;  Surgeon: Saundra Barragan DPM;  [facility];  Service: Podiatry    ME OPEN TX PHALANGEAL SHAFT FRACTURE PROX/MIDDLE EA Left 3/28/2017    Procedure: OPEN REDUCTION W/ INTERNAL FIXATION (ORIF)PROXIMAL PHALANX RING FINGER;  Surgeon: Salo Hardin MD;  [facility];  Service: Orthopedics       Family History   Problem Relation Age of Onset    Deep vein thrombosis Mother     Multiple sclerosis Mother     Hypertension Father        Social History     Occupational History    Not on file  Social History Main Topics    Smoking status: Current Every Day Smoker     Packs/day: 1 50     Types: Cigarettes    Smokeless tobacco: Never Used      Comment: X 40+ yrs      Alcohol use Yes      Comment: daily ETOH in past, recently has a 6 pk of beer on weekends    Drug use: No    Sexual activity: Not on file       Current Outpatient Prescriptions on File Prior to Visit   Medication Sig    amitriptyline (ELAVIL) 150 MG tablet Take 200 mg by mouth daily at bedtime      aspirin 81 MG tablet Take 81 mg by mouth daily    carvedilol (COREG) 3 125 mg tablet Take 3 125 mg by mouth 2 (two) times a day with meals    Cholecalciferol (VITAMIN D3) 1000 units CAPS Take by mouth    cyanocobalamin (VITAMIN B-12) 1,000 mcg tablet Take 1,000 mcg by mouth daily    folic acid (FOLVITE) 1 mg tablet Take 1 mg by mouth daily    levothyroxine 50 mcg tablet Take 50 mcg by mouth daily    lisinopril (ZESTRIL) 20 mg tablet Take 20 mg by mouth daily    metFORMIN (GLUCOPHAGE) 1000 MG tablet Take 1,000 mg by mouth 2 (two) times a day with meals    Multiple Vitamin (ONE-A-DAY MENS PO) Take 1 tablet by mouth daily    Multiple Vitamins-Minerals (MENS ONE DAILY PO) Take by mouth    naproxen (NAPROSYN) 500 mg tablet Take 500 mg by mouth every 12 (twelve) hours as needed for mild pain    omeprazole (PriLOSEC) 20 mg delayed release capsule Take 20 mg by mouth daily    simvastatin (ZOCOR) 10 mg tablet Take 10 mg by mouth daily at bedtime     No current facility-administered medications on file prior to visit  Allergies   Allergen Reactions    Penicillins Anaphylaxis and Hives     Other reaction(s): Anaphylaxis  Other reaction(s): Hives    Rocephin [Ceftriaxone] Anaphylaxis    Robaxin [Methocarbamol] Rash       Physical Exam:    BP (!) 160/106   Pulse 94   Ht 5' 9" (1 753 m)   Wt 102 kg (224 lb)   BMI 33 08 kg/m²     Constitutional: normal, well developed, well nourished, alert, in no distress and non-toxic and no overt pain behavior  Eyes: anicteric  HEENT: grossly intact  Neck: supple, symmetric, trachea midline and no masses   Pulmonary:even and unlabored  Cardiovascular:No edema or pitting edema present  Skin:Normal without rashes or lesions and well hydrated  Psychiatric:Mood and affect appropriate  Neurologic:Cranial Nerves II-XII grossly intact  Musculoskeletal:normal and ambulates with cane, difficulty from sitting to standing to sitting position patient is experiencing a great deal of pain well-healed surgical on lumbar sacral spine no other obvious skin lesions or erythema, is pain to palpation left PSIS negative on the right no greater trochanteric tenderness is good generalized weakness lower limbs I cannot appreciate any focal deficits positive straight leg raising on the left negative on the right decreased sensation left L5 distribution to pinwheel compared to the right    Imaging          01/14/19 XR lumbar spine 2V           M54 9  AP and lateral views of the lumbar spine compared to 3/13/2006   5 lumbar vertebral bodies are present  There are laminectomies from L3 through S1  Extensive hypertrophic bone is present at L2-3, L3-4 and L4-5  There is mild disc space narrowing at L2-3, L3-4, L4-5   Grade 1 anterior listhesis of L4 in relation to L5 is present  No fracture or bone lesion  There is severe atherosclerosis of the abdominal aorta without aneurysm measuring about 3 cm AP distally  An inferior vena cava OptEase filter is present and there are bilateral total hip replacements  Impression:  Lower lumbar laminectomy  Extensive hypertrophic degenerative facet disease  Degenerative disc disease  See above  I have personally reviewed pertinent films in PACS

## 2019-01-24 ENCOUNTER — HOSPITAL ENCOUNTER (OUTPATIENT)
Dept: RADIOLOGY | Facility: CLINIC | Age: 64
Discharge: HOME | End: 2019-01-24
Admitting: ANESTHESIOLOGY
Payer: COMMERCIAL

## 2019-01-24 VITALS
SYSTOLIC BLOOD PRESSURE: 166 MMHG | OXYGEN SATURATION: 97 % | TEMPERATURE: 97.7 F | HEART RATE: 68 BPM | DIASTOLIC BLOOD PRESSURE: 81 MMHG | RESPIRATION RATE: 20 BRPM

## 2019-01-24 DIAGNOSIS — M46.1 SACROILIITIS (HCC): ICD-10-CM

## 2019-01-24 PROCEDURE — 27096 INJECT SACROILIAC JOINT: CPT | Performed by: ANESTHESIOLOGY

## 2019-01-24 RX ORDER — METHYLPREDNISOLONE ACETATE 80 MG/ML
80 INJECTION, SUSPENSION INTRA-ARTICULAR; INTRALESIONAL; INTRAMUSCULAR; PARENTERAL; SOFT TISSUE ONCE
Status: COMPLETED | OUTPATIENT
Start: 2019-01-24 | End: 2019-01-24

## 2019-01-24 RX ORDER — LIDOCAINE HYDROCHLORIDE 10 MG/ML
5 INJECTION, SOLUTION EPIDURAL; INFILTRATION; INTRACAUDAL; PERINEURAL ONCE
Status: COMPLETED | OUTPATIENT
Start: 2019-01-24 | End: 2019-01-24

## 2019-01-24 RX ADMIN — METHYLPREDNISOLONE ACETATE 80 MG: 80 INJECTION, SUSPENSION INTRA-ARTICULAR; INTRALESIONAL; INTRAMUSCULAR; SOFT TISSUE at 09:35

## 2019-01-24 RX ADMIN — LIDOCAINE HYDROCHLORIDE 3 ML: 10 INJECTION, SOLUTION EPIDURAL; INFILTRATION; INTRACAUDAL; PERINEURAL at 09:28

## 2019-01-24 RX ADMIN — LIDOCAINE HYDROCHLORIDE 2 ML: 20 INJECTION, SOLUTION EPIDURAL; INFILTRATION; INTRACAUDAL at 09:29

## 2019-01-24 RX ADMIN — IOHEXOL 1 ML: 300 INJECTION, SOLUTION INTRAVENOUS at 09:28

## 2019-01-24 NOTE — H&P
History of Present Illness: The patient is a 70 y o  male who presents with complaints of low back pain      Patient Active Problem List   Diagnosis    Nonischemic cardiomyopathy ([facility] )    Closed displaced fracture of proximal phalanx of ring finger    Abnormal stress test    Benign essential hypertension    Arteriosclerosis of coronary artery    Chronic pain disorder    Diabetes mellitus type 2 with neurological manifestations ([place] [address] )    Hyperlipemia    LBBB (left bundle branch block)    Pyogenic inflammation of bone (HCC)    Pain in joint of right hip    Right knee pain    Thoracic or lumbosacral neuritis or radiculitis    Skin ulcer of toe of left foot with fat layer exposed ([facility]    Spinal stenosis    Spondylosis of 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       Past Medical History:   Diagnosis Date    Arthritis     Colon polyps     Current every day smoker     Diabetes mellitus ([place] [address] )     type II    Drinks beer     DVT (deep venous thrombosis) ([place] [address] ) 2008    l leg    Full dentures     Hyperlipidemia     Hypertension     Vocal cord polyps        Past Surgical History:   Procedure Laterality Date    BACK SURGERY      COLONOSCOPY      HERNIA REPAIR Right     inguinal    IVC FILTER INSERTION      JOINT REPLACEMENT      l tkr and r thr    LARYNGOSCOPY      LUMBAR SPINE SURGERY      MI AMPUTATION TOE,I-P JT Left 4/25/2017    Procedure: 2ND TOE AMPUTATION;  Surgeon: Luisa De Jesus DPM;  Location:  MAIN OR;  Service: Podiatry    MI OPEN TX PHALANGEAL SHAFT FRACTURE PROX/MIDDLE EA Left 3/28/2017    Procedure: OPEN REDUCTION W/ INTERNAL FIXATION (ORIF)PROXIMAL PHALANX RING FINGER;  Surgeon: Loren Matamoros MD;  [facility];  Service: Orthopedics         Current Outpatient Prescriptions:     amitriptyline (ELAVIL) 150 MG tablet, Take 200 mg by mouth daily at bedtime  , Disp: , Rfl:     aspirin 81 MG tablet, Take 81 mg by mouth daily, Disp: , Rfl:     carvedilol (COREG) 3 125 mg tablet, Take 3 125 mg by mouth 2 (two) times a day with meals, Disp: , Rfl:     Cholecalciferol (VITAMIN D3) 1000 units CAPS, Take by mouth, Disp: , Rfl:     clotrimazole-betamethasone (LOTRISONE) 1-0 05 % cream, Apply topically, Disp: , Rfl:     cyanocobalamin (VITAMIN B-12) 1,000 mcg tablet, Take 1,000 mcg by mouth daily, Disp: , Rfl:     diclofenac potassium (CATAFLAM) 50 mg tablet, Take by mouth, Disp: , Rfl:     DULoxetine (CYMBALTA) 60 mg delayed release capsule, , Disp: , Rfl:     folic acid (FOLVITE) 1 mg tablet, Take 1 mg by mouth daily, Disp: , Rfl:     HYDROcodone-acetaminophen (NORCO) 5-325 mg per tablet, , Disp: , Rfl:     levothyroxine 50 mcg tablet, Take 50 mcg by mouth daily, Disp: , Rfl:     Lidocaine-Menthol 4-5 % PTCH, Apply topically, Disp: , Rfl:     lisinopril (ZESTRIL) 20 mg tablet, Take 20 mg by mouth daily, Disp: , Rfl:     metFORMIN (GLUCOPHAGE) 1000 MG tablet, Take 1,000 mg by mouth 2 (two) times a day with meals, Disp: , Rfl:     Multiple Vitamin (ONE-A-DAY MENS PO), Take 1 tablet by mouth daily, Disp: , Rfl:     Multiple Vitamins-Minerals (MENS ONE DAILY PO), Take by mouth, Disp: , Rfl:     naproxen (NAPROSYN) 500 mg tablet, Take 500 mg by mouth every 12 (twelve) hours as needed for mild pain, Disp: , Rfl:     omeprazole (PriLOSEC) 20 mg delayed release capsule, Take 20 mg by mouth daily, Disp: , Rfl:     simvastatin (ZOCOR) 10 mg tablet, Take 10 mg by mouth daily at bedtime, Disp: , Rfl:     Current Facility-Administered Medications:     iohexol (OMNIPAQUE) 300 mg/mL injection 50 mL, 50 mL, Intra-articular, Once, Joaquin Bernstein,     lidocaine (PF) (XYLOCAINE-MPF) 1 % injection 5 mL, 5 mL, Other, Once, Joaquin Bernstein,     lidocaine (PF) (XYLOCAINE-MPF) 2 % injection 5 mL, 5 mL, Intra-articular, Once, Joaquin Bernstein DO    methylPREDNISolone acetate (DEPO-MEDROL) injection 80 mg, 80 mg, Intra-articular, Once, Joaquin Bernstein DO    Allergies   Allergen Reactions  Penicillins Anaphylaxis and Hives     Other reaction(s): Anaphylaxis  Other reaction(s): Hives    Rocephin [Ceftriaxone] Anaphylaxis    Robaxin [Methocarbamol] Rash       Physical Exam:   Vitals:    01/24/19 0909   BP: 157/84   Pulse: 70   Resp: 20   Temp: 97 7 °F (36 5 °C)   SpO2: 98%     General: Awake, Alert, Oriented x 3, Mood and affect appropriate  Respiratory: Respirations even and unlabored  Cardiovascular: Peripheral pulses intact; no edema  Musculoskeletal Exam:  Decreased range of motion lumbar spine    ASA Score: II    Patient/Chart Verification  Patient ID Verified: Verbal  ID Band Applied: No  Consents Confirmed: Procedural, To be obtained in the Pre-Procedure area  H&P( within 30 days) Verified: To be obtained in the Pre-Procedure area  Interval H&P(within 24 hr) Complete (required for Outpatients and Surgery Admit only): To be obtained in the Pre-Procedure area  Allergies Reviewed: Yes  Anticoag/NSAID held?: NA  Currently on antibiotics?: No    Assessment:   1   Sacroiliitis ([facility] )        Plan: Left SI Joint

## 2019-01-24 NOTE — DISCHARGE INSTRUCTIONS
Steroid Joint Injection   WHAT YOU NEED TO KNOW:   A steroid joint injection is a procedure to inject steroid medicine into a joint  Steroid medicine decreases pain and inflammation  The injection may also contain an anesthetic (numbing medicine) to decrease pain  It may be done to treat conditions such as arthritis, gout, or carpal tunnel syndrome  The injections may be given in your knee, ankle, shoulder, elbow, wrist, ankle or sacroiliac joint  1  Do not apply heat to any area that is numb  If you have discomfort or soreness at the injection site, you may apply ice today, 20 minutes on and 20 minutes off  Tomorrow you may use ice or warm, moist heat  Do not apply ice or heat directly to the skin  2  You may have an increase or change in the discomfort for 36-48 hours after your treatment  Apply ice and continue with any pain medicine you have been prescribed  3  Do not do anything strenuous today  You may shower, but no tub baths or hot tubs today  You may resume your normal activities tomorrow, but do not overdo it  Resume normal activities slowly when you are feeling better  4  If you experience redness, drainage or swelling at the injection site, or if you develop a fever above 100 degrees, please call [facility] at [phone] or go to the Emergency Room  5  Continue to take all routine medicines prescribed by your primary care physician unless otherwise instructed by our staff  Most blood thinners should be started again according to your regularly scheduled dosing  If you have any questions, please give our office a call  If you have a problem specifically related to your procedure, please call our office at [phone]  Problems not related to your procedure should be directed to your primary care physician

## 2019-01-30 ENCOUNTER — TELEPHONE (OUTPATIENT)
Dept: PAIN MEDICINE | Facility: CLINIC | Age: 64
End: 2019-01-30

## 2019-01-31 ENCOUNTER — TELEPHONE (OUTPATIENT)
Dept: PAIN MEDICINE | Facility: CLINIC | Age: 64
End: 2019-01-31

## 2019-02-13 ENCOUNTER — TELEPHONE (OUTPATIENT)
Dept: PAIN MEDICINE | Facility: MEDICAL CENTER | Age: 64
End: 2019-02-13

## 2019-02-13 NOTE — TELEPHONE ENCOUNTER
Patient left a voicemail yesterday @ 1:08p requesting a call back   Pt can be reached at [phone]

## 2019-02-18 NOTE — TELEPHONE ENCOUNTER
Forwarding to [address] [place] [address]  Please contact pt to schedule inj as per SL  Thank you

## 2019-02-18 NOTE — TELEPHONE ENCOUNTER
S/w pt, stated taht she s/w someone last week - his fu ov was cancelled  Pt questioned another injection  States he has 50% improvement S/P L SIJ inj  Pt states his relief comes and goes, continues w/ pain on the outside of his L thigh  Advised pt, will d/w Dr Rocio Delarosa and cb to advise  Pt verbalized understandign and appreciation

## 2019-02-18 NOTE — TELEPHONE ENCOUNTER
Called pt to schedule him for recommended procedure and pt states his pain in not in that area on the outside of his thigh, Pt states his pain in on the inside of his lt thigh closer to the groin area  Advised pt will discuss with physician and someone will get back to him in regards to his pain

## 2019-02-25 ENCOUNTER — OFFICE VISIT (OUTPATIENT)
Dept: PAIN MEDICINE | Facility: CLINIC | Age: 64
End: 2019-02-25
Payer: COMMERCIAL

## 2019-02-25 VITALS
SYSTOLIC BLOOD PRESSURE: 152 MMHG | HEIGHT: 69 IN | DIASTOLIC BLOOD PRESSURE: 82 MMHG | BODY MASS INDEX: 33.33 KG/M2 | WEIGHT: 225 LBS | HEART RATE: 67 BPM

## 2019-02-25 DIAGNOSIS — M46.1 SACROILIITIS (HCC): ICD-10-CM

## 2019-02-25 DIAGNOSIS — M79.652 LEFT THIGH PAIN: ICD-10-CM

## 2019-02-25 DIAGNOSIS — M48.062 SPINAL STENOSIS OF LUMBAR REGION WITH NEUROGENIC CLAUDICATION: ICD-10-CM

## 2019-02-25 DIAGNOSIS — G89.29 CHRONIC LEFT-SIDED LOW BACK PAIN WITHOUT SCIATICA: Primary | ICD-10-CM

## 2019-02-25 DIAGNOSIS — G89.4 CHRONIC PAIN DISORDER: ICD-10-CM

## 2019-02-25 DIAGNOSIS — M54.50 CHRONIC LEFT-SIDED LOW BACK PAIN WITHOUT SCIATICA: Primary | ICD-10-CM

## 2019-02-25 DIAGNOSIS — M47.816 SPONDYLOSIS OF LUMBAR REGION WITHOUT MYELOPATHY OR RADICULOPATHY: ICD-10-CM

## 2019-02-25 PROCEDURE — 99213 OFFICE O/P EST LOW 20 MIN: CPT | Performed by: NURSE PRACTITIONER

## 2019-02-25 NOTE — PROGRESS NOTES
Assessment:  1  Chronic left-sided low back pain without sciatica    2  Left thigh pain    3  Chronic pain disorder    4  Spondylosis of 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    5  Spinal stenosis of lumbar region with neurogenic claudication    6  Sacroiliitis ([address] )        Plan:  While the patient was in the office today, I discussed with the patient at this point time since he is noting moderate to significant relief of his left-sided low back pain and some improvement of the left leg pain, for now, I feel it is in his best interest to hold off any repeat injections for the next 2-3 months, if not longer  However, if his pain symptoms should return, he should call our office or scheduled appointment and we can discuss how to proceed from there  The patient was agreeable and verbalized an understanding  With regards to the leg pain, I explained to the patient that it may improve over the next several weeks and for now we will see how he does but if his pain is at least at the current level or better we will hold off any injections or further imaging  However, if the pain in his leg worsens, we may proceed with an updated CT scan of the lumbar spine  The patient was agreeable and verbalized an understanding  I discussed with the patient that at this point time he can followup with our office on an as-needed basis  I did review the patient that if his pain symptoms should change, worsen, and/or if he would experience any new symptoms he would like to be evaluated for, he should give our office a call  The patient was agreeable and verbalized an understanding  History of Present Illness: The patient is a 70 y o  male last seen on 1/24/19 who presents for a follow up office visit in regards to chronic pain secondary to lumbar spondylosis and stenosis and left sacroiliitis    The patient currently reports that since his last office visit as he is status post a left sacroiliac joint injection on January 24, 2019 with Dr Kesha Guerra, he has noticed moderate to significant improvement of his left-sided low back pain and some improvement of the left anterior inner thigh pain  Overall his pain is minimal and manageable at this point  I have personally reviewed and/or updated the patient's past medical history, past surgical history, family history, social history, current medications, allergies, and vital signs today  Review of Systems:    Review of Systems   Respiratory: Negative for shortness of breath  Cardiovascular: Negative for chest pain  Gastrointestinal: Negative for constipation, diarrhea, nausea and vomiting  Musculoskeletal: Positive for gait problem  Negative for arthralgias, joint swelling (joint stiffness) and myalgias  Skin: Negative for rash  Neurological: Negative for dizziness, seizures and weakness  All other systems reviewed and are negative          Past Medical History:   Diagnosis Date    Arthritis     Colon polyps     Current every day smoker     Diabetes mellitus ([facility] )     type II    Drinks beer     D[facility] thrombosis[facility] 2008    l leg    Full dentures     Hyperlipidemia     Hypertension     Vocal cord polyps        Past Surgical History:   Procedure Laterality Date    BACK SURGERY      COLONOSCOPY      HERNIA REPAIR Right     inguinal    IVC FILTER INSERTION      JOINT REPLACEMENT      l tkr and r thr    LARYNGOSCOPY      LUMBAR SPINE SURGERY      OK AMPUTATION TOE,I-P JT Left 4/25/2017    Procedure: 2ND TOE AMPUTATION;  Surgeon: Chano Healy DPM;  Location:  MAIN OR;  Service: Podiatry    OK OPEN TX PHALANGEAL SHAFT FRACTURE PROX/MIDDLE EA Left 3/28/2017    Procedure: OPEN REDUCTION W/ INTERNAL FIXATION (ORIF)PROXIMAL PHALANX RING FINGER;  Surgeon: Easton Chou MD;  Location: [facility];  Service: Orthopedics       Family History   Problem Relation Age of Onset    Deep vein thrombosis Mother     Multiple sclerosis Mother     Hypertension Father        Social History     Occupational History    Not on file   Tobacco Use    Smoking status: Current Every Day Smoker     Packs/day: 1 50     Types: Cigarettes    Smokeless tobacco: Never Used    Tobacco comment: X 40+ yrs     Substance and Sexual Activity    Alcohol use: Yes     Comment: daily ETOH in past, recently has a 6 pk of beer on weekends    Drug use: No    Sexual activity: Not on file         Current Outpatient Medications:     amitriptyline (ELAVIL) 150 MG tablet, Take 200 mg by mouth daily at bedtime  , Disp: , Rfl:     aspirin 81 MG tablet, Take 81 mg by mouth daily, Disp: , Rfl:     carvedilol (COREG) 3 125 mg tablet, Take 3 125 mg by mouth 2 (two) times a day with meals, Disp: , Rfl:     Cholecalciferol (VITAMIN D3) 1000 units CAPS, Take by mouth, Disp: , Rfl:     clotrimazole-betamethasone (LOTRISONE) 1-0 05 % cream, Apply topically, Disp: , Rfl:     cyanocobalamin (VITAMIN B-12) 1,000 mcg tablet, Take 1,000 mcg by mouth daily, Disp: , Rfl:     diclofenac potassium (CATAFLAM) 50 mg tablet, Take by mouth, Disp: , Rfl:     DULoxetine (CYMBALTA) 60 mg delayed release capsule, , Disp: , Rfl:     folic acid (FOLVITE) 1 mg tablet, Take 1 mg by mouth daily, Disp: , Rfl:     HYDROcodone-acetaminophen (NORCO) 5-325 mg per tablet, , Disp: , Rfl:     levothyroxine 50 mcg tablet, Take 50 mcg by mouth daily, Disp: , Rfl:     Lidocaine-Menthol 4-5 % PTCH, Apply topically, Disp: , Rfl:     lisinopril (ZESTRIL) 20 mg tablet, Take 20 mg by mouth daily, Disp: , Rfl:     metFORMIN (GLUCOPHAGE) 1000 MG tablet, Take 1,000 mg by mouth 2 (two) times a day with meals, Disp: , Rfl:     Multiple Vitamin (ONE-A-DAY MENS PO), Take 1 tablet by mouth daily, Disp: , Rfl:     Multiple Vitamins-Minerals (MENS ONE DAILY PO), Take by mouth, Disp: , Rfl:     naproxen (NAPROSYN) 500 mg tablet, Take 500 mg by mouth every 12 (twelve) hours as needed for mild pain, Disp: , Rfl:     omeprazole (PriLOSEC) 20 mg delayed release capsule, Take 20 mg by mouth daily, Disp: , Rfl:     simvastatin (ZOCOR) 10 mg tablet, Take 10 mg by mouth daily at bedtime, Disp: , Rfl:     Allergies   Allergen Reactions    Penicillins Anaphylaxis and Hives     Other reaction(s): Anaphylaxis  Other reaction(s): Hives    Rocephin [Ceftriaxone] Anaphylaxis    Robaxin [Methocarbamol] Rash       Physical Exam:    /82   Pulse 67   Ht 5' 9" (1 753 m)   Wt 102 kg (225 lb)   BMI 33 23 kg/m²     Constitutional:normal, well developed, well nourished, alert, in no distress and non-toxic and no overt pain behavior  Eyes:anicteric  HEENT:grossly intact  Neck:supple, symmetric, trachea midline and no masses   Pulmonary:even and unlabored  Cardiovascular:No edema or pitting edema present  Skin:Normal without rashes or lesions and well hydrated  Psychiatric:Mood and affect appropriate  Neurologic:Cranial Nerves II-XII grossly intact  Musculoskeletal:Slightly antalgic, but steady gait without the use of any assistive devices  Imaging  No orders to display         No orders of the defined types were placed in this encounter

## 2019-02-26 PROBLEM — M46.1 SACROILIITIS (HCC): Status: ACTIVE | Noted: 2019-02-26

## 2019-02-26 PROBLEM — M54.50 CHRONIC LEFT-SIDED LOW BACK PAIN WITHOUT SCIATICA: Status: ACTIVE | Noted: 2019-02-26

## 2019-02-26 PROBLEM — G89.29 CHRONIC LEFT-SIDED LOW BACK PAIN WITHOUT SCIATICA: Status: ACTIVE | Noted: 2019-02-26

## 2019-05-28 ENCOUNTER — TELEPHONE (OUTPATIENT)
Dept: PAIN MEDICINE | Facility: CLINIC | Age: 64
End: 2019-05-28

## 2019-06-03 ENCOUNTER — OFFICE VISIT (OUTPATIENT)
Dept: PAIN MEDICINE | Facility: CLINIC | Age: 64
End: 2019-06-03
Payer: COMMERCIAL

## 2019-06-03 VITALS
HEIGHT: 69 IN | DIASTOLIC BLOOD PRESSURE: 90 MMHG | HEART RATE: 70 BPM | WEIGHT: 225 LBS | SYSTOLIC BLOOD PRESSURE: 144 MMHG | BODY MASS INDEX: 33.33 KG/M2

## 2019-06-03 DIAGNOSIS — M46.1 SACROILIITIS (HCC): Primary | ICD-10-CM

## 2019-06-03 PROCEDURE — 99214 OFFICE O/P EST MOD 30 MIN: CPT | Performed by: ANESTHESIOLOGY

## 2019-06-13 ENCOUNTER — TELEPHONE (OUTPATIENT)
Dept: PAIN MEDICINE | Facility: CLINIC | Age: 64
End: 2019-06-13

## 2019-06-14 ENCOUNTER — HOSPITAL ENCOUNTER (OUTPATIENT)
Dept: RADIOLOGY | Facility: CLINIC | Age: 64
Discharge: HOME | End: 2019-06-14
Admitting: ANESTHESIOLOGY
Payer: COMMERCIAL

## 2019-06-14 VITALS
SYSTOLIC BLOOD PRESSURE: 128 MMHG | RESPIRATION RATE: 20 BRPM | HEART RATE: 61 BPM | OXYGEN SATURATION: 95 % | DIASTOLIC BLOOD PRESSURE: 77 MMHG | TEMPERATURE: 97.8 F

## 2019-06-14 DIAGNOSIS — M46.1 SACROILIITIS (HCC): ICD-10-CM

## 2019-06-14 PROCEDURE — 27096 INJECT SACROILIAC JOINT: CPT | Performed by: ANESTHESIOLOGY

## 2019-06-14 RX ORDER — METHYLPREDNISOLONE ACETATE 80 MG/ML
80 INJECTION, SUSPENSION INTRA-ARTICULAR; INTRALESIONAL; INTRAMUSCULAR; PARENTERAL; SOFT TISSUE ONCE
Status: COMPLETED | OUTPATIENT
Start: 2019-06-14 | End: 2019-06-14

## 2019-06-14 RX ORDER — LIDOCAINE HYDROCHLORIDE 10 MG/ML
5 INJECTION, SOLUTION EPIDURAL; INFILTRATION; INTRACAUDAL; PERINEURAL ONCE
Status: COMPLETED | OUTPATIENT
Start: 2019-06-14 | End: 2019-06-14

## 2019-06-14 RX ADMIN — LIDOCAINE HYDROCHLORIDE 3 ML: 10 INJECTION, SOLUTION EPIDURAL; INFILTRATION; INTRACAUDAL; PERINEURAL at 09:10

## 2019-06-14 RX ADMIN — IOHEXOL 1 ML: 300 INJECTION, SOLUTION INTRAVENOUS at 09:11

## 2019-06-14 RX ADMIN — LIDOCAINE HYDROCHLORIDE 2 ML: 20 INJECTION, SOLUTION EPIDURAL; INFILTRATION; INTRACAUDAL at 09:11

## 2019-06-14 RX ADMIN — METHYLPREDNISOLONE ACETATE 80 MG: 80 INJECTION, SUSPENSION INTRA-ARTICULAR; INTRALESIONAL; INTRAMUSCULAR; SOFT TISSUE at 09:11

## 2019-06-21 ENCOUNTER — TELEPHONE (OUTPATIENT)
Dept: PAIN MEDICINE | Facility: CLINIC | Age: 64
End: 2019-06-21

## 2019-07-16 ENCOUNTER — TELEPHONE (OUTPATIENT)
Dept: PAIN MEDICINE | Facility: CLINIC | Age: 64
End: 2019-07-16

## 2019-10-25 LAB
CREAT ?TM UR-SCNC: 74.7 UMOL/L
EXT MICROALBUMIN URINE RANDOM: 58.9
MICROALBUMIN/CREAT UR: 78.8 MG/G{CREAT}

## 2020-05-11 LAB — HBA1C MFR BLD HPLC: 6.3 %

## 2020-07-03 NOTE — PROGRESS NOTES
Cardiology Follow Up    [facility]  1955  [id number]  [facility]  [address]  [address]  [phone]  [phone]    1  Essential (primary) hypertension     2  Pure hypercholesterolemia     3  Coronary arteriosclerosis     4  Ischemic cardiomyopathy     5  LBBB (left bundle branch block)         Interval History:  Patient is here for a follow-up visit  He has a prior history of left bundle branch block  Recent EKG done June 8, 2020 demonstrated sinus rhythm with left bundle branch block  He is a long-term user of tobacco and alcohol  He does have a prior history of left leg DVT treated with Coumadin  Patient had recent office visit with his primary care physician  There was issue in reference to lower extremity edema  Patient did have a venous Doppler done on June 3rd which showed no evidence of DVT  Recent blood work looked unremarkable  Total cholesterol was 131 with an HDL of 52 and an LDL of 65  Patient had cardiac catheterization done October 2016 which demonstrated moderately reduced LV systolic function with an ejection fraction of 40%  Coronary arteries at that time showed no severe obstructive disease  Patient had a 10% ostial left main lesion and a discrete 25% mid right coronary lesion  Echocardiogram done September 2016 demonstrated a left ventricular ejection fraction of in attendance  35-40% with segmental wall motion abnormality and mild LVH noted  There was no significant valvular heart disease  His cardiomyopathy was felt to be related to his alcohol use  Patient has had no chest pain or significant dyspnea  His vital signs are stable today  His wife is here today  Patient is currently taking furosemide 20 mg per day and metolazone 5 mg every other day  He has lost about 38 lb  He does feel better    He apparently had blood work done which looked good in reference to renal function  Patient Active Problem List   Diagnosis    Nonischemic cardiomyopathy ([place] [address] )    Closed displaced fracture of proximal phalanx of ring finger    Abnormal stress test    Benign essential hypertension    Arteriosclerosis of coronary artery    Chronic pain disorder    Diabetes mellitus type 2 with neurological manifestations ([place] [address] )    Hyperlipemia    LBBB (left bundle branch block)    Pyogenic inflammation of bone (HCC)    Pain in joint of right hip    Right knee pain    Skin ulcer of toe of left foot with fat layer exposed ([place] [address] )    Spinal stenosis    Spondylosis of 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    Left thigh pain    Sacroiliitis (HCC)    Chronic left-sided low back pain without sciatica     Past Medical History:   Diagnosis Date    Arthritis     Colon polyps     Current every day smoker     Diabetes mellitus ([place] [address] )     type II    Drinks beer     DVT (deep venous thrombosis) ([place] [address] ) 2008    l leg    Full dentures     Hyperlipidemia     Hypertension     Vocal cord polyps      Social History     Socioeconomic History    Marital status: /Civil Union     Spouse name: Not on file    Number of children: Not on file    Years of education: Not on file    Highest education level: Not on file   Occupational History    Not on file   Social Needs    Financial resource strain: Not on file    Food insecurity:     Worry: Not on file     Inability: Not on file    Transportation needs:     Medical: Not on file     Non-medical: Not on file   Tobacco Use    Smoking status: Current Every Day Smoker     Packs/day: 1 50     Types: Cigarettes    Smokeless tobacco: Never Used    Tobacco comment: X 40+ yrs     Substance and Sexual Activity    Alcohol use: Yes     Comment: daily ETOH in past, recently has a 6 pk of beer on weekends    Drug use: No    Sexual activity: Not on file   Lifestyle    Physical activity:     Days per week: Not on file     Minutes per session: Not on file    Stress: Not on file   Relationships    Social connections:     Talks on phone: Not on file     Gets together: Not on file     Attends Buddhist service: Not on file     Active member of club or organization: Not on file     Attends meetings of clubs or organizations: Not on file     Relationship status: Not on file    Intimate partner violence:     Fear of current or ex partner: Not on file     Emotionally abused: Not on file     Physically abused: Not on file     Forced sexual activity: Not on file   Other Topics Concern    Not on file   Social History Narrative    Not on file      Family History   Problem Relation Age of Onset    Deep vein thrombosis Mother     Multiple sclerosis Mother     Hypertension Father      Past Surgical History:   Procedure Laterality Date    BACK SURGERY      COLONOSCOPY      HERNIA REPAIR Right     inguinal    IVC FILTER INSERTION      JOINT REPLACEMENT      l tkr and r thr    LARYNGOSCOPY      LUMBAR SPINE SURGERY      [place] AMPUTATION TOE,I-P JT Left 4/25/2017    Procedure: 2ND TOE AMPUTATION;  Surgeon: Katlyn Angelo DPM;  Location:  MAIN OR;  Service: Podiatry    IA OPEN TX PHALANGEAL SHAFT FRACTURE PROX/MIDDLE EA Left 3/28/2017    Procedure: OPEN REDUCTION W/ INTERNAL FIXATION (ORIF)PROXIMAL PHALANX RING FINGER;  Surgeon: Jaylene Limon MD;  Location[facility];  Service: Orthopedics       Current Outpatient Medications:     aspirin 81 MG tablet, Take 81 mg by mouth daily, Disp: , Rfl:     carvedilol (COREG) 3 125 mg tablet, Take 3 125 mg by mouth 2 (two) times a day with meals, Disp: , Rfl:     Cholecalciferol (VITAMIN D3) 1000 units CAPS, Take by mouth, Disp: , Rfl:     clotrimazole-betamethasone (LOTRISONE) 1-0 05 % cream, Apply topically, Disp: , Rfl:     cyanocobalamin (VITAMIN B-12) 1,000 mcg tablet, Take 1,000 mcg by mouth daily, Disp: , Rfl:     diclofenac potassium (CATAFLAM) 50 mg tablet, Take by mouth, Disp: , Rfl:    folic acid (FOLVITE) 1 mg tablet, Take 1 mg by mouth daily, Disp: , Rfl:     furosemide (LASIX) 20 mg tablet, Take 20 mg by mouth daily, Disp: , Rfl:     gabapentin (NEURONTIN) 100 mg capsule, TAKE 1 CAPSULE AT BEDTIME FOR 1 WEEK THEN INCREASE TO 2 CAPS AT BEDTIME, Disp: , Rfl:     levothyroxine 50 mcg tablet, Take 50 mcg by mouth daily, Disp: , Rfl:     Lidocaine-Menthol 4-5 % PTCH, Apply topically, Disp: , Rfl:     lisinopril (ZESTRIL) 20 mg tablet, Take 20 mg by mouth daily, Disp: , Rfl:     metFORMIN (GLUCOPHAGE) 1000 MG tablet, Take 1,000 mg by mouth 2 (two) times a day with meals, Disp: , Rfl:     metolazone (ZAROXOLYN) 5 mg tablet, 5 mg every other day, Disp: , Rfl:     simvastatin (ZOCOR) 10 mg tablet, Take 10 mg by mouth daily at bedtime, Disp: , Rfl:     DULoxetine (CYMBALTA) 60 mg delayed release capsule, , Disp: , Rfl:   Allergies   Allergen Reactions    Penicillins Anaphylaxis and Hives     Other reaction(s): Anaphylaxis  Other reaction(s): Hives    Rocephin [Ceftriaxone] Anaphylaxis    Robaxin [Methocarbamol] Rash       Labs:not applicable  Imaging: No results found  Review of Systems:  Review of Systems   All other systems reviewed and are negative  Physical Exam:  /62 (BP Location: Left arm, Patient Position: Sitting, Cuff Size: Standard)   Pulse 72   Ht 5' 10" (1 778 m)   Wt 90 6 kg (199 lb 12 8 oz)   BMI 28 67 kg/m²   Physical Exam   Constitutional: He is oriented to person, place, and time  He appears well-developed and well-nourished  HENT:   Head: Normocephalic and atraumatic  Eyes: Pupils are equal, round, and reactive to light  Conjunctivae are normal    Neck: Normal range of motion  Neck supple  Cardiovascular: Normal rate and normal heart sounds  Pulmonary/Chest: Effort normal and breath sounds normal    Neurological: He is alert and oriented to person, place, and time  Skin: Skin is warm and dry  Psychiatric: He has a normal mood and affect  Vitals reviewed  Discussion/Summary:  Will continue his present medical regimen although I did tell him that at some point he likely could be off metolazone and just on maintenance furosemide 20 mg per day  Patient's EKG today is unchanged compared to prior tracings  I will check an echocardiogram to compared to his prior study  We will assess LV wall thickness and systolic function and valvular structure and function  I do not think he needs a stress test as he had prior catheterization showing no significant coronary disease  I counseled the patient on alcohol and tobacco use  He does admit to drinking more beer on the weekend and currently smokes one pack per day  I have asked him to call if there is a problem in the interim otherwise I will see him in follow-up in about three months time  I also asked him to be more aware of the salt intake of foods that he eats as his wife admits to me that he used to eat a lot of salted nuts

## 2020-07-08 ENCOUNTER — CONSULT (OUTPATIENT)
Dept: CARDIOLOGY CLINIC | Facility: CLINIC | Age: 65
End: 2020-07-08
Payer: COMMERCIAL

## 2020-07-08 VITALS
DIASTOLIC BLOOD PRESSURE: 62 MMHG | WEIGHT: 199.8 LBS | HEIGHT: 70 IN | HEART RATE: 72 BPM | SYSTOLIC BLOOD PRESSURE: 122 MMHG | BODY MASS INDEX: 28.6 KG/M2

## 2020-07-08 DIAGNOSIS — I44.7 LBBB (LEFT BUNDLE BRANCH BLOCK): ICD-10-CM

## 2020-07-08 DIAGNOSIS — I25.5 ISCHEMIC CARDIOMYOPATHY: ICD-10-CM

## 2020-07-08 DIAGNOSIS — E78.00 PURE HYPERCHOLESTEROLEMIA: ICD-10-CM

## 2020-07-08 DIAGNOSIS — I25.10 CORONARY ARTERIOSCLEROSIS: ICD-10-CM

## 2020-07-08 DIAGNOSIS — I10 ESSENTIAL (PRIMARY) HYPERTENSION: Primary | ICD-10-CM

## 2020-07-08 PROCEDURE — 99215 OFFICE O/P EST HI 40 MIN: CPT | Performed by: INTERNAL MEDICINE

## 2020-07-08 RX ORDER — GABAPENTIN 100 MG/1
CAPSULE ORAL
COMMUNITY
Start: 2020-06-30

## 2020-07-08 RX ORDER — METOLAZONE 5 MG/1
5 TABLET ORAL EVERY OTHER DAY
COMMUNITY
Start: 2020-06-05 | End: 2021-11-25 | Stop reason: HOSPADM

## 2020-07-08 RX ORDER — FUROSEMIDE 20 MG/1
20 TABLET ORAL DAILY
COMMUNITY
Start: 2020-06-05 | End: 2021-11-25 | Stop reason: HOSPADM

## 2020-07-09 ENCOUNTER — HOSPITAL ENCOUNTER (OUTPATIENT)
Dept: NON INVASIVE DIAGNOSTICS | Facility: HOSPITAL | Age: 65
Discharge: HOME | End: 2020-07-09
Attending: INTERNAL MEDICINE
Payer: COMMERCIAL

## 2020-07-09 DIAGNOSIS — I10 ESSENTIAL (PRIMARY) HYPERTENSION: ICD-10-CM

## 2020-07-09 DIAGNOSIS — I25.10 CORONARY ARTERIOSCLEROSIS: ICD-10-CM

## 2020-07-09 DIAGNOSIS — I25.5 ISCHEMIC CARDIOMYOPATHY: ICD-10-CM

## 2020-07-09 DIAGNOSIS — E78.00 PURE HYPERCHOLESTEROLEMIA: ICD-10-CM

## 2020-07-09 DIAGNOSIS — I44.7 LBBB (LEFT BUNDLE BRANCH BLOCK): ICD-10-CM

## 2020-07-09 PROCEDURE — 93306 TTE W/DOPPLER COMPLETE: CPT | Performed by: INTERNAL MEDICINE

## 2020-07-09 PROCEDURE — 93306 TTE W/DOPPLER COMPLETE: CPT

## 2020-07-09 NOTE — RESULT ENCOUNTER NOTE
Please call patient  His LV function has diminished compared to prior study  Tell him that I advise that he discontinue alcohol and do his best to eliminate cigarettes as well  Stopping alcohol will help his LV function improve  Please recheck a limited echo in 3-4 months to check LV function  If there is no improvement he will likely require a device placement  Advise that he increase his carvedilol to 6 25 mg b i d  Thank you

## 2020-07-14 ENCOUNTER — TELEPHONE (OUTPATIENT)
Dept: CARDIOLOGY CLINIC | Facility: CLINIC | Age: 65
End: 2020-07-14

## 2020-07-14 DIAGNOSIS — F17.200 NICOTINE DEPENDENCE WITH CURRENT USE: Primary | ICD-10-CM

## 2020-07-14 DIAGNOSIS — I10 ESSENTIAL (PRIMARY) HYPERTENSION: ICD-10-CM

## 2020-07-14 DIAGNOSIS — I25.5 ISCHEMIC CARDIOMYOPATHY: ICD-10-CM

## 2020-07-14 DIAGNOSIS — I25.5 ISCHEMIC CARDIOMYOPATHY: Primary | ICD-10-CM

## 2020-07-14 RX ORDER — NICOTINE 21 MG/24HR
1 PATCH, TRANSDERMAL 24 HOURS TRANSDERMAL EVERY 24 HOURS
Qty: 28 PATCH | Refills: 6 | Status: SHIPPED | OUTPATIENT
Start: 2020-07-14 | End: 2021-12-16

## 2020-07-14 RX ORDER — CARVEDILOL 6.25 MG/1
6.25 TABLET ORAL 2 TIMES DAILY WITH MEALS
Qty: 180 TABLET | Refills: 1 | Status: SHIPPED | OUTPATIENT
Start: 2020-07-14 | End: 2020-12-06

## 2020-07-14 NOTE — TELEPHONE ENCOUNTER
----- Message from Gabriel Brock MD sent at 7/9/2020  2:17 PM EDT -----  Please call patient  His LV function has diminished compared to prior study  Tell him that I advise that he discontinue alcohol and do his best to eliminate cigarettes as well  Stopping alcohol will help his LV function improve  Please recheck a limited echo in 3-4 months to check LV function  If there is no improvement he will likely require a device placement  Advise that he increase his carvedilol to 6 25 mg b i d  Thank you

## 2020-07-14 NOTE — TELEPHONE ENCOUNTER
Called pt - message relayed as given  Pt verbalized understanding and will call his PCP for OV to discuss alcohol cessation strategies  Will order Echo and mail to pt  Pt would like to know if we can prescribe Nicotine Patch for him?

## 2020-11-04 ENCOUNTER — HOSPITAL ENCOUNTER (OUTPATIENT)
Dept: NON INVASIVE DIAGNOSTICS | Facility: HOSPITAL | Age: 65
Discharge: HOME | End: 2020-11-04
Attending: INTERNAL MEDICINE
Payer: COMMERCIAL

## 2020-11-04 DIAGNOSIS — I10 ESSENTIAL (PRIMARY) HYPERTENSION: ICD-10-CM

## 2020-11-04 DIAGNOSIS — I25.5 ISCHEMIC CARDIOMYOPATHY: ICD-10-CM

## 2020-11-04 PROCEDURE — 93306 TTE W/DOPPLER COMPLETE: CPT | Performed by: INTERNAL MEDICINE

## 2020-11-04 PROCEDURE — 93308 TTE F-UP OR LMTD: CPT

## 2020-11-10 ENCOUNTER — TELEPHONE (OUTPATIENT)
Dept: CARDIOLOGY CLINIC | Facility: CLINIC | Age: 65
End: 2020-11-10

## 2020-12-02 ENCOUNTER — OFFICE VISIT (OUTPATIENT)
Dept: CARDIOLOGY CLINIC | Facility: CLINIC | Age: 65
End: 2020-12-02
Payer: COMMERCIAL

## 2020-12-02 VITALS
WEIGHT: 204 LBS | HEIGHT: 70 IN | BODY MASS INDEX: 29.2 KG/M2 | HEART RATE: 60 BPM | DIASTOLIC BLOOD PRESSURE: 70 MMHG | SYSTOLIC BLOOD PRESSURE: 128 MMHG

## 2020-12-02 DIAGNOSIS — I10 ESSENTIAL (PRIMARY) HYPERTENSION: Primary | ICD-10-CM

## 2020-12-02 DIAGNOSIS — I25.5 ISCHEMIC CARDIOMYOPATHY: ICD-10-CM

## 2020-12-02 DIAGNOSIS — I44.7 LBBB (LEFT BUNDLE BRANCH BLOCK): ICD-10-CM

## 2020-12-02 DIAGNOSIS — I25.10 CORONARY ARTERIOSCLEROSIS: ICD-10-CM

## 2020-12-02 DIAGNOSIS — E78.00 PURE HYPERCHOLESTEROLEMIA: ICD-10-CM

## 2020-12-02 PROCEDURE — 99214 OFFICE O/P EST MOD 30 MIN: CPT | Performed by: INTERNAL MEDICINE

## 2020-12-02 RX ORDER — AMITRIPTYLINE HYDROCHLORIDE 150 MG/1
150 TABLET, FILM COATED ORAL DAILY
COMMUNITY
Start: 2020-11-17

## 2020-12-02 RX ORDER — DICLOFENAC SODIUM 75 MG/1
75 TABLET, DELAYED RELEASE ORAL 2 TIMES DAILY
COMMUNITY
Start: 2020-10-27 | End: 2021-09-01

## 2020-12-02 RX ORDER — LISINOPRIL 40 MG/1
40 TABLET ORAL DAILY
COMMUNITY
Start: 2020-09-21

## 2020-12-05 DIAGNOSIS — I25.5 ISCHEMIC CARDIOMYOPATHY: ICD-10-CM

## 2020-12-06 RX ORDER — CARVEDILOL 6.25 MG/1
TABLET ORAL
Qty: 180 TABLET | Refills: 1 | Status: SHIPPED | OUTPATIENT
Start: 2020-12-06 | End: 2021-06-01 | Stop reason: SDUPTHER

## 2021-01-13 ENCOUNTER — TRANSCRIBE ORDERS (OUTPATIENT)
Dept: ADMINISTRATIVE | Facility: HOSPITAL | Age: 66
End: 2021-01-13

## 2021-01-13 ENCOUNTER — HOSPITAL ENCOUNTER (OUTPATIENT)
Dept: RADIOLOGY | Facility: HOSPITAL | Age: 66
Discharge: HOME | End: 2021-01-13
Attending: PODIATRIST
Payer: COMMERCIAL

## 2021-01-13 DIAGNOSIS — M79.671 RIGHT FOOT PAIN: ICD-10-CM

## 2021-01-13 DIAGNOSIS — M79.671 RIGHT FOOT PAIN: Primary | ICD-10-CM

## 2021-01-13 PROCEDURE — 73630 X-RAY EXAM OF FOOT: CPT

## 2021-06-01 DIAGNOSIS — I25.5 ISCHEMIC CARDIOMYOPATHY: ICD-10-CM

## 2021-06-01 RX ORDER — CARVEDILOL 6.25 MG/1
6.25 TABLET ORAL 2 TIMES DAILY WITH MEALS
Qty: 180 TABLET | Refills: 0 | Status: SHIPPED | OUTPATIENT
Start: 2021-06-01 | End: 2021-08-30

## 2021-06-01 NOTE — TELEPHONE ENCOUNTER
Carvedilol to [facility]    Pt due for appt in June  LM on VM to call for appt  Dr Jassi Martell out of office, will route to covering

## 2021-08-02 ENCOUNTER — OFFICE VISIT (OUTPATIENT)
Dept: PAIN MEDICINE | Facility: CLINIC | Age: 66
End: 2021-08-02
Payer: COMMERCIAL

## 2021-08-02 VITALS
HEART RATE: 54 BPM | TEMPERATURE: 97.5 F | HEIGHT: 70 IN | SYSTOLIC BLOOD PRESSURE: 126 MMHG | WEIGHT: 212 LBS | BODY MASS INDEX: 30.35 KG/M2 | DIASTOLIC BLOOD PRESSURE: 80 MMHG

## 2021-08-02 DIAGNOSIS — M48.061 LUMBAR FORAMINAL STENOSIS: Primary | ICD-10-CM

## 2021-08-02 DIAGNOSIS — G89.4 CHRONIC PAIN SYNDROME: ICD-10-CM

## 2021-08-02 DIAGNOSIS — Z98.890 STATUS POST LUMBAR SPINE OPERATION: ICD-10-CM

## 2021-08-02 DIAGNOSIS — M54.16 LUMBAR RADICULOPATHY: ICD-10-CM

## 2021-08-02 PROCEDURE — 99214 OFFICE O/P EST MOD 30 MIN: CPT | Performed by: ANESTHESIOLOGY

## 2021-08-02 NOTE — PROGRESS NOTES
Assessment  1  Lumbar foraminal stenosis    2  Lumbar radiculopathy    3  Chronic pain syndrome    4  Status post lumbar spine operation        Plan    The patient's pain persists despite time, relative rest, activity modification and therapy  I believe that he may benefit from a lumbar epidural steroid injection to diminish any inflammatory component of his pain  I will initially use a transforaminal approach to better concentrate the steroid along the affected nerve root  This would serve both diagnostic and hopefully therapeutic purposes  Given the patient's history of diabetes, there may be an increased risk of infection in association with the procedure although the overall risk is low  In addition, following the injection there may be a transient elevation in serum glucose levels for several days  The patient understands that this may require additional glycemic coverage in coordination with the treating physician  He does not obtain significant relief than not believe interventional procedures would be appropriate  Could stated consider medication management verses spinal cord stimulation  In the office today, we reviewed the nature of the patient's pathology in depth using  diagrams and models  I discussed the approach I would use for the epidural steroid injection and provided literature for home review  The patient understands the risks associated with the procedure including but not limited to bleeding, infection, tissue injury, decreased immunity secondary to steroid injection, exacerbation of symptoms, allergic reaction, spinal headache, and paralysis and provided verbal consent  My impressions and treatment recommendations were discussed in detail with the patient who verbalized understanding and had no further questions  Discharge instructions were provided  I personally saw and examined the patient and I agree with the above discussed plan of care    This note is created using dictation transcription  It may contain typographical errors, grammatical errors, improperly dictated words, background noise and other errors  Orders Placed This Encounter   Procedures    FL spine and pain procedure     Standing Status:   Future     Standing Expiration Date:   8/2/2025     Order Specific Question:   Reason for Exam:     Answer:   Left L3 and left L4 TFESI with pain diary     Order Specific Question:   Anticoagulant hold needed? Answer:   no     No orders of the defined types were placed in this encounter  History of Present Illness    Shaye Hurley is a 77 y o  male who we have known to our practice who has been re-evaluated by Orthopedics, Dr reyes, who is referring for consideration a transforaminal epidural steroid injection  He reports pain across his low back down his left leg 10/10 on the visual analog scale is constant describes sharp throbbing and pressure-like with shooting and numbing sensation  Standing bending and walking aggravate his symptoms he has pain does interfere with daily living activities  I have personally reviewed and/or updated the patient's past medical history, past surgical history, family history, social history, current medications, allergies, and vital signs today  Review of Systems   Respiratory: Negative for shortness of breath  Cardiovascular: Negative for chest pain  Gastrointestinal: Negative for constipation, diarrhea, nausea and vomiting  Musculoskeletal: Positive for gait problem and joint swelling  Negative for arthralgias and myalgias  Skin: Negative for rash  Neurological: Negative for dizziness, seizures and weakness  All other systems reviewed and are negative        Patient Active Problem List   Diagnosis    [facility]ische[facility]    Closed displaced fracture of proximal phalanx of ring finger    Abnormal stress test    Benign essential hypertension    Arteriosclerosis of coronary artery    Chronic pain disorder    Diabetes mellitus type 2 with neurological manifestations (HCC)    Hyperlipemia    LBBB (left bundle branch block)    Pyogenic inflammation of bone (HCC)    Pain in joint of right hip    Right knee pain    Skin ulcer of toe of left foot with fat layer exposed ([facility] )    Spinal stenosis    Spondylosis of 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    Left thigh pain    Sacroiliitis (HCC)    Chronic left-sided low back pain without sciatica       Past Medical History:   Diagnosis Date    Arthritis     Colon polyps     Current every day smoker     Diabetes mellitus ([facility] )     type II    Drinks beer     [facility]VT [facility]deep venous thrombosis) ([facility] ) 2008    l leg    Full dentures     Hyperlipidemia     Hypertension     Vocal cord polyps        Past Surgical History:   Procedure Laterality Date    BACK SURGERY      COLONOSCOPY      HERNIA REPAIR Right     inguinal    IVC FILTER INSERTION      JOINT REPLACEMENT      l tkr and r thr    LARYNGOSCOPY      LUMBAR SPINE SURGERY      CT AMPUTATION TOE,I-P JT Left 4/25/2017    Procedure: 2ND TOE AMPUTATION;  Surgeon: Cam Pineda DPM;  Location:  MAIN OR;  Service: Podiatry    CT OPEN TX PHALANGEAL SHAFT FRACTURE PROX/MIDDLE EA Left 3/28/2017    Procedure: OPEN REDUCTION W/ INTERNAL FIXATION (ORIF)PROXIMAL PHALANX RING FINGER;  Surgeon: Sage Mcclellan MD;  Location:  MAIN OR;  Service: Orthopedics       Family History   Problem Relation Age of Onset    Deep vein thrombosis Mother     Multiple sclerosis Mother     Hypertension Father        Social History     Occupational History    Not on file   Tobacco Use    Smoking status: Current Every Day Smoker     Packs/day: 1 50     Types: Cigarettes    Smokeless tobacco: Never Used    Tobacco comment: X 40+ yrs     Substance and Sexual Activity    Alcohol use: Yes     Comment: daily ETOH in past, recently has a 6 pk of beer on weekends    Drug use: No    Sexual activity: Not on file       Current Outpatient Medications on File Prior to Visit   Medication Sig    amitriptyline (ELAVIL) 150 MG tablet Take 150 mg by mouth daily    aspirin 81 MG tablet Take 81 mg by mouth daily    carvedilol (COREG) 6 25 mg tablet Take 1 tablet (6 25 mg total) by mouth 2 (two) times a day with meals    Cholecalciferol (VITAMIN D3) 1000 units CAPS Take by mouth    clotrimazole-betamethasone (LOTRISONE) 1-0 05 % cream Apply topically    cyanocobalamin (VITAMIN B-12) 1,000 mcg tablet Take 1,000 mcg by mouth daily    DULoxetine (CYMBALTA) 60 mg delayed release capsule     folic acid (FOLVITE) 1 mg tablet Take 1 mg by mouth daily    furosemide (LASIX) 20 mg tablet Take 20 mg by mouth daily    gabapentin (NEURONTIN) 100 mg capsule TAKE 1 CAPSULE AT BEDTIME FOR 1 WEEK THEN INCREASE TO 2 CAPS AT BEDTIME    levothyroxine 50 mcg tablet Take 50 mcg by mouth daily    Lidocaine-Menthol 4-5 % PTCH Apply topically    lisinopril (ZESTRIL) 40 mg tablet Take 40 mg by mouth daily    metFORMIN (GLUCOPHAGE) 1000 MG tablet Take 1,000 mg by mouth 2 (two) times a day with meals    metolazone (ZAROXOLYN) 5 mg tablet 5 mg every other day    simvastatin (ZOCOR) 10 mg tablet Take 10 mg by mouth daily at bedtime    diclofenac (VOLTAREN) 75 mg EC tablet Take 75 mg by mouth 2 (two) times a day Take with food or milk (Patient not taking: Reported on 8/2/2021)    nicotine (NICODERM CQ) 21 mg/24 hr TD 24 hr patch Place 1 patch on the skin every 24 hours (Patient not taking: Reported on 12/2/2020)     No current facility-administered medications on file prior to visit  Allergies   Allergen Reactions    Penicillins Anaphylaxis and Hives     Other reaction(s):  Anaphylaxis  Other reaction(s): Hives    Rocephin [Ceftriaxone] Anaphylaxis    Robaxin [Methocarbamol] Rash       Physical Exam    /80 (BP Location: Left arm, Patient Position: Sitting, Cuff Size: Standard)   Pulse (!) 54   Temp 97 5 °F (36 4 °C)   Ht 5' 10" (1 778 m)   Wt 96 2 kg (212 lb)   BMI 30 42 kg/m²     Constitutional: normal, well developed, well nourished, alert, in no distress and non-toxic and no overt pain behavior  Eyes: anicteric  HEENT: grossly intact  Neck: supple, symmetric, trachea midline and no masses   Pulmonary:even and unlabored  Cardiovascular:No edema or pitting edema present  Skin:Normal without rashes or lesions and well hydrated  Psychiatric:Mood and affect appropriate  Neurologic:Cranial Nerves II-XII grossly intact  Musculoskeletal:normal, difficulty going from sitting to standing sitting position, no obvious skin lesions or erythema lumbar sacral spine well-healed surgical scar, deep tendon reflexes are diminished but symmetrical bilateral patellar, absent left Achilles 1+ right Achilles decreased sensation left L4 distribution to pinwheel, positive straight leg raising left negative on the right no focal motor deficit appreciated lower limbs    Imaging    MRI LUMBAR 7/2/2021 @ [facility]  Surgical decompression procedure sent for performed from the L2-L3 level through the L5-S` level  At L2-3 there is mild bilateral neural foramen stenosis  At L3-4 there is bilateral enhancing perineural scar impinging on the L4 nerves bilaterally  There is severe bilateral L3 neural foramen stenosis  At L4-54 there is mild grade 1 spondylolisthesis  There is mild right neural foramen stenosis  At T12-L1 there is a mild disc bulge  There is no stenosis    I have personally reviewed pertinent films in PACS

## 2021-08-09 ENCOUNTER — HOSPITAL ENCOUNTER (OUTPATIENT)
Dept: RADIOLOGY | Facility: CLINIC | Age: 66
Discharge: HOME | End: 2021-08-09
Attending: ANESTHESIOLOGY
Payer: COMMERCIAL

## 2021-08-09 VITALS
RESPIRATION RATE: 20 BRPM | HEART RATE: 70 BPM | DIASTOLIC BLOOD PRESSURE: 78 MMHG | TEMPERATURE: 96.3 F | OXYGEN SATURATION: 94 % | SYSTOLIC BLOOD PRESSURE: 146 MMHG

## 2021-08-09 DIAGNOSIS — M48.061 LUMBAR FORAMINAL STENOSIS: ICD-10-CM

## 2021-08-09 DIAGNOSIS — M54.16 LUMBAR RADICULOPATHY: ICD-10-CM

## 2021-08-09 PROCEDURE — 64483 NJX AA&/STRD TFRM EPI L/S 1: CPT | Performed by: ANESTHESIOLOGY

## 2021-08-09 PROCEDURE — 64484 NJX AA&/STRD TFRM EPI L/S EA: CPT | Performed by: ANESTHESIOLOGY

## 2021-08-09 RX ORDER — BUPIVACAINE HCL/PF 2.5 MG/ML
10 VIAL (ML) INJECTION ONCE
Status: COMPLETED | OUTPATIENT
Start: 2021-08-09 | End: 2021-08-09

## 2021-08-09 RX ORDER — PAPAVERINE HCL 150 MG
20 CAPSULE, EXTENDED RELEASE ORAL ONCE
Status: COMPLETED | OUTPATIENT
Start: 2021-08-09 | End: 2021-08-09

## 2021-08-09 RX ADMIN — DEXAMETHASONE SODIUM PHOSPHATE 20 MG: 10 INJECTION, SOLUTION INTRAMUSCULAR; INTRAVENOUS at 11:41

## 2021-08-09 RX ADMIN — IOHEXOL 2 ML: 300 INJECTION, SOLUTION INTRAVENOUS at 11:41

## 2021-08-09 RX ADMIN — BUPIVACAINE HYDROCHLORIDE 2 ML: 2.5 INJECTION, SOLUTION EPIDURAL; INFILTRATION; INTRACAUDAL at 11:41

## 2021-08-09 NOTE — DISCHARGE INSTRUCTIONS
Epidural Steroid Injection   WHAT YOU NEED TO KNOW:   An epidural steroid injection (HOLGER) is a procedure to inject steroid medicine into the epidural space  The epidural space is between your spinal cord and vertebrae  Steroids reduce inflammation and fluid buildup in your spine that may be causing pain  You may be given pain medicine along with the steroids  ACTIVITY  · Do not drive or operate machinery today  · No strenuous activity today - bending, lifting, etc   · You may resume normal activites starting tomorrow - start slowly and as tolerated  · You may shower today, but no tub baths or hot tubs  · You may have numbness for several hours from the local anesthetic  Please use caution and common sense, especially with weight-bearing activities  CARE OF THE INJECTION SITE  · If you have soreness or pain, apply ice to the area today (20 minutes on/20 minutes off)  · Starting tomorrow, you may use warm, moist heat or ice if needed  · You may have an increase or change in your discomfort for 36-48 hours after your treatment  · Apply ice and continue with any pain medication you have been prescribed  · Notify [facility] if you have any of the following: redness, drainage, swelling, headache, stiff neck or fever above 100°F     SPECIAL INSTRUCTIONS  · Our office will contact you in approximately 7 days for a progress report  MEDICATIONS  · Continue to take all routine medications  · Our office may have instructed you to hold some medications  As no general anesthesia was used in today's procedure, you should not experience any side effects related to anesthesia  If you have a problem specifically related to your procedure, please call our office at [phone]  Problems not related to your procedure should be directed to your primary care physician

## 2021-08-09 NOTE — H&P
History of Present Illness: The patient is a 77 y o  male who presents with complaints of low back and leg pain      Patient Active Problem List   Diagnosis    [facility]mic [facility] ([facility] )    Closed displaced fracture of proximal phalanx of ring finger    Abnormal stress test    Benign essential hypertension    Arteriosclerosis of coronary artery    Chronic pain disorder    Diabetes mellitus type 2 with neurological manifestations (HCC)    Hyperlipemia    LBBB (left bundle branch block)    Pyogenic inflammation of bone (HCC)    Pain in joint of right hip    Right knee pain    Skin ulcer of toe of left foot with fat layer exposed ([facility] )    Spinal stenosis    Spondylosis of 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    Left thigh pain    Sacroiliitis (HCC)    Chronic left-sided low back pain without sciatica    Lumbar foraminal stenosis    Lumbar radiculopathy       Past Medical History:   Diagnosis Date    Arthritis     Colon polyps     Current every day smoker     [facility]     type II    Drinks beer     DVT (deep venous thrombosis) ([facility] ) 2008    l leg    Full dentures     Hyperlipidemia     Hypertension     Vocal cord polyps        Past Surgical History:   Procedure Laterality Date    BACK SURGERY      COLONOSCOPY      HERNIA REPAIR Right     inguinal    IVC FILTER INSERTION      JOINT REPLACEMENT      l tkr and r thr    LARYNGOSCOPY      LUMBAR SPINE SURGERY      ND AMPUTATION TOE,I-P JT Left 4/25/2017    Procedure: 2ND TOE AMPUTATION;  Surgeon: Savanah Chan DPM;  Location: [facility];  Service: Podiatry    ND OPEN TX PHALANGEAL SHAFT FRACTURE PROX/MIDDLE EA Left 3/28/2017    Procedure: OPEN REDUCTION W/ INTERNAL FIXATION (ORIF)PROXIMAL PHALANX RING FINGER;  Surgeon: Anup Padilla MD;  Location: [facility];  Service: Orthopedics         Current Outpatient Medications:     amitriptyline (ELAVIL) 150 MG tablet, Take 150 mg by mouth daily, Disp: , Rfl:     aspirin 81 MG tablet, Take 81 mg by mouth daily, Disp: , Rfl:     carvedilol (COREG) 6 25 mg tablet, Take 1 tablet (6 25 mg total) by mouth 2 (two) times a day with meals, Disp: 180 tablet, Rfl: 0    Cholecalciferol (VITAMIN D3) 1000 units CAPS, Take by mouth, Disp: , Rfl:     clotrimazole-betamethasone (LOTRISONE) 1-0 05 % cream, Apply topically, Disp: , Rfl:     cyanocobalamin (VITAMIN B-12) 1,000 mcg tablet, Take 1,000 mcg by mouth daily, Disp: , Rfl:     diclofenac (VOLTAREN) 75 mg EC tablet, Take 75 mg by mouth 2 (two) times a day Take with food or milk (Patient not taking: Reported on 8/2/2021), Disp: , Rfl:     DULoxetine (CYMBALTA) 60 mg delayed release capsule, , Disp: , Rfl:     folic acid (FOLVITE) 1 mg tablet, Take 1 mg by mouth daily, Disp: , Rfl:     furosemide (LASIX) 20 mg tablet, Take 20 mg by mouth daily, Disp: , Rfl:     gabapentin (NEURONTIN) 100 mg capsule, TAKE 1 CAPSULE AT BEDTIME FOR 1 WEEK THEN INCREASE TO 2 CAPS AT BEDTIME, Disp: , Rfl:     levothyroxine 50 mcg tablet, Take 50 mcg by mouth daily, Disp: , Rfl:     Lidocaine-Menthol 4-5 % PTCH, Apply topically, Disp: , Rfl:     lisinopril (ZESTRIL) 40 mg tablet, Take 40 mg by mouth daily, Disp: , Rfl:     metFORMIN (GLUCOPHAGE) 1000 MG tablet, Take 1,000 mg by mouth 2 (two) times a day with meals, Disp: , Rfl:     metolazone (ZAROXOLYN) 5 mg tablet, 5 mg every other day, Disp: , Rfl:     nicotine (NICODERM CQ) 21 mg/24 hr TD 24 hr patch, Place 1 patch on the skin every 24 hours (Patient not taking: Reported on 12/2/2020), Disp: 28 patch, Rfl: 6    simvastatin (ZOCOR) 10 mg tablet, Take 10 mg by mouth daily at bedtime, Disp: , Rfl:     Current Facility-Administered Medications:     bupivacaine (PF) (MARCAINE) 0 25 % injection 10 mL, 10 mL, Epidural, Once, Joaquin Bernstein DO    dexamethasone (PF) (DECADRON) injection 20 mg, 20 mg, Epidural, Once, Joaquin Bernstein DO    iohexol (OMNIPAQUE) 300 mg/mL injection 50 mL, 50 mL, Epidural, Once, Joaquin Bernstein, DO    Allergies   Allergen Reactions    Penicillins Anaphylaxis and Hives     Other reaction(s): Anaphylaxis  Other reaction(s): Hives    Rocephin [Ceftriaxone] Anaphylaxis    Robaxin [Methocarbamol] Rash       Physical Exam:   General: Awake, Alert, Oriented x 3, Mood and affect appropriate  Respiratory: Respirations even and unlabored  Cardiovascular: Peripheral pulses intact; no edema  Musculoskeletal Exam:  Decreased range of motion lumbar spine    ASA Score: II         Assessment:   1  Lumbar foraminal stenosis    2   Lumbar radiculopathy        Plan: Left L3 and left L4 TFESI with pain diary

## 2021-08-16 ENCOUNTER — TELEPHONE (OUTPATIENT)
Dept: PAIN MEDICINE | Facility: CLINIC | Age: 66
End: 2021-08-16

## 2021-08-16 NOTE — TELEPHONE ENCOUNTER
1st attempt  Lm to cb with % improvement and pain level       S/p Left L3 and left L4 TFESI 8/9 F/u 9/1

## 2021-08-23 NOTE — TELEPHONE ENCOUNTER
Pt returned call he has had  50 % improvement in the beginning but it has returned to the same  , pain level  8/10-  Please be advised thank you    Pt can be reached @ [phone]

## 2021-08-29 DIAGNOSIS — I25.5 ISCHEMIC CARDIOMYOPATHY: ICD-10-CM

## 2021-08-30 RX ORDER — CARVEDILOL 6.25 MG/1
TABLET ORAL
Qty: 180 TABLET | Refills: 0 | Status: SHIPPED | OUTPATIENT
Start: 2021-08-30 | End: 2021-11-03

## 2021-09-01 ENCOUNTER — OFFICE VISIT (OUTPATIENT)
Dept: PAIN MEDICINE | Facility: CLINIC | Age: 66
End: 2021-09-01
Payer: COMMERCIAL

## 2021-09-01 VITALS
WEIGHT: 212 LBS | DIASTOLIC BLOOD PRESSURE: 82 MMHG | HEIGHT: 70 IN | SYSTOLIC BLOOD PRESSURE: 134 MMHG | TEMPERATURE: 98 F | BODY MASS INDEX: 30.35 KG/M2 | HEART RATE: 58 BPM

## 2021-09-01 DIAGNOSIS — M47.816 SPONDYLOSIS OF LUMBAR REGION WITHOUT MYELOPATHY OR RADICULOPATHY: ICD-10-CM

## 2021-09-01 DIAGNOSIS — M54.50 CHRONIC LEFT-SIDED LOW BACK PAIN WITHOUT SCIATICA: ICD-10-CM

## 2021-09-01 DIAGNOSIS — G89.29 CHRONIC LEFT-SIDED LOW BACK PAIN WITHOUT SCIATICA: ICD-10-CM

## 2021-09-01 DIAGNOSIS — G89.4 CHRONIC PAIN SYNDROME: Primary | ICD-10-CM

## 2021-09-01 DIAGNOSIS — M54.16 LUMBAR RADICULOPATHY: ICD-10-CM

## 2021-09-01 DIAGNOSIS — M48.061 LUMBAR FORAMINAL STENOSIS: ICD-10-CM

## 2021-09-01 PROCEDURE — 99214 OFFICE O/P EST MOD 30 MIN: CPT | Performed by: NURSE PRACTITIONER

## 2021-09-01 NOTE — PATIENT INSTRUCTIONS
Epidural Steroid Injection   AMBULATORY CARE:   What you need to know about an epidural steroid injection (HOLGER):  An HOLGER is a procedure to inject steroid medicine into the epidural space  The epidural space is between your spinal cord and vertebrae  Steroids reduce inflammation and fluid buildup in your spine that may be causing pain  You may be given pain medicine along with the steroids  How to prepare for an HOLGER:  Your healthcare provider will talk to you about how to prepare for your procedure  He or she will tell you what medicines to take or not take on the day of your procedure  You may need to stop taking blood thinners or other medicines several days before your procedure  You may need to adjust any diabetes medicine you take on the day of your procedure  Steroid medicine can increase your blood sugar level  Arrange for someone to drive you home when you are discharged  What will happen during an HOLGER:   · You will be given medicine to numb the procedure area  You will be awake for the procedure, but you will not feel pain  You may also be given medicine to help you relax  Contrast liquid will be used to help your healthcare provider see the area better  Tell the healthcare provider if you have ever had an allergic reaction to contrast liquid  · Your healthcare provider may place the needle into your neck area, middle of your back, or tailbone area  He may inject the medicine next to the nerves that are causing your pain  He may instead inject the medicine into a larger area of the epidural space  This helps the medicine spread to more nerves  Your healthcare provider will use a fluoroscope to help guide the needle to the right place  A fluoroscope is a type of x-ray  After the procedure, a bandage will be placed over the injection site to prevent infection  What will happen after an HOLGER:  You will have a bandage over the injection site to prevent infection   Your healthcare provider will tell you when you can bathe and any activity guidelines  You will be able to go home  Risks of an HOLGER:  You may have temporary or permanent nerve damage or paralysis  You may have bleeding or develop a serious infection, such as meningitis (swelling of the brain coverings)  An abscess may also develop  An abscess is a pus-filled area under the skin  You may need surgery to fix the abscess  You may have a seizure, anxiety, or trouble sleeping  If you are a man, you may have temporary erectile dysfunction (not able to have an erection)  Call your local emergency number (911 in the [address] [place] [address],[address]) if:   · You have a seizure  · You have trouble moving your legs  Seek care immediately if:   · Blood soaks through your bandage  · You have a fever or chills, severe back pain, and the procedure area is sensitive to the touch  · You cannot control when you urinate or have a bowel movement  Call your doctor if:   · You have weakness or numbness in your legs  · Your wound is red, swollen, or draining pus  · You have nausea or are vomiting  · Your face or neck is red and you feel warm  · You have more pain than you had before the procedure  · You have swelling in your hands or feet  · You have questions or concerns about your condition or care  Care for your wound as directed: You may remove the bandage before you go to bed the day of your procedure  You may take a shower, but do not take a bath for at least 24 hours  Self-care:   · Do not drive,  use machines, or do strenuous activity for 24 hours after your procedure or as directed  · Continue other treatments  as directed  Steroid injections alone will not control your pain  The injections are meant to be used with other treatments, such as physical therapy  Follow up with your doctor as directed:  Write down your questions so you remember to ask them during your visits     © Copyright [employer] 2021 Information is for End User's use only and may not be sold, redistributed or otherwise used for commercial purposes  All illustrations and images included in CareNotes® are the copyrighted property of [employer]  or [address] [employer]  The above information is an  only  It is not intended as medical advice for individual conditions or treatments  Talk to your doctor, nurse or pharmacist before following any medical regimen to see if it is safe and effective for you

## 2021-09-01 NOTE — PROGRESS NOTES
Assessment:  1  Chronic pain syndrome    2  Chronic left-sided low back pain without sciatica    3  Lumbar radiculopathy    4  Spondylosis of 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    5  Lumbar foraminal stenosis        Plan:    While the patient was in the office today, I did have a thorough conversation with the patient regarding their chronic pain syndrome, symptoms, medication regimen, and treatment plan  I discussed with the patient and his wife that at this point time since he is noting moderate overall relief and I still feel there is a significant inflammatory and neuropathic component to his pain symptoms and his pain is in an L3 and L4 dermatome distribution, I do feel that proceeding with a repeat left L3 and L4 transforaminal epidural steroid injection with Dr Hoda Schaeffer would be appropriate  I explained the patient that hopefully he will note better overall relief of the leg pain as well from this injection  The patient was agreeable and verbalized an understanding  Complete risks and benefits including bleeding, infection, tissue reaction, nerve injury and allergic reaction were discussed  The approach was demonstrated using models and literature was provided  Verbal and written consent was obtained  I discussed with the patient and his wife that once they have the 2nd injection schedule, they should then schedule follow-up with Dr Cesar Sanderson at least 2-3 weeks after the injection to give the injection some time so he will have a better idea as to whether not how helpful it has been and so we can discuss his treatment options  The patient and his wife were agreeable and verbalized understanding        At this point time the patient is already on Cymbalta, Elavil, and gabapentin so at this point I do not feel that there is any other medication options we could discuss and if he is not a surgical candidate and is not noting the relief he would like we could always consider the possibility of spinal cord stimulation in the future as previously discussed or mentioned by Dr Adele Brittle  I discussed with the patient that at this point time he can followup with our office on an as-needed basis  I did review the patient that if his pain symptoms should change, worsen, and/or if he would experience any new symptoms he would like to be evaluated for, he should give our office a call  The patient was agreeable and verbalized an understanding  History of Present Illness: The patient is a 74 y o  male last seen on 8/9/2021 who presents for a follow up office visit in regards to Chronic pain syndrome secondary to lumbar spondylosis with stenosis and radiculopathy  The patient currently reports that at this point time as he is status post a left L3 and L4 transforaminal epidural steroid injection on August 9, 2021 with Dr Adele Brittle that there is definitely improvement in his pain  The patient reports that there is at least 50% improvement if not more in the low back pain and not as much improvement or minimal improvement in the leg pain  The patient and his wife present today to discuss his treatment plan options as he has not yet followed up with Dr Delvis De La Cruz because he is trying to avoid surgical options at this point in time  The patient and his wife present today for regular postprocedure follow-up visit  I have personally reviewed and/or updated the patient's past medical history, past surgical history, family history, social history, current medications, allergies, and vital signs today  Review of Systems:    Review of Systems   Respiratory: Negative for shortness of breath  Cardiovascular: Negative for chest pain  Gastrointestinal: Negative for constipation, diarrhea, nausea and vomiting  Musculoskeletal: Positive for gait problem and joint swelling (joint stiffness)  Negative for arthralgias and myalgias  Skin: Negative for rash     Neurological: Positive for dizziness and weakness  Negative for seizures  All other systems reviewed and are negative  Past Medical History:   Diagnosis Date    Arthritis     Colon polyps     Current every day smoker     Diabetes mellitus ([facility] )     type II    Drinks beer     DVT (deep venous thrombosis) ([facility] [address] ) 2008    l leg    Full dentures     Hyperlipidemia     Hypertension     Vocal cord polyps        Past Surgical History:   Procedure Laterality Date    BACK SURGERY      COLONOSCOPY      HERNIA REPAIR Right     inguinal    IVC FILTER INSERTION      JOINT REPLACEMENT      l tkr and r thr    LARYNGOSCOPY      LUMBAR SPINE SURGERY      MT AMPUTATION TOE,I-P JT Left 4/25/2017    Procedure: 2ND TOE AMPUTATION;  Surgeon: Tori Garcia DPM;  Location:  MAIN OR;  Service: Podiatry    MT OPEN TX PHALANGEAL SHAFT FRACTURE PROX/MIDDLE EA Left 3/28/2017    Procedure: OPEN REDUCTION W/ INTERNAL FIXATION (ORIF)PROXIMAL PHALANX RING FINGER;  Surgeon: Aileen Patton MD;  Location:  MAIN OR;  Service: Orthopedics       Family History   Problem Relation Age of Onset    Deep vein thrombosis Mother     Multiple sclerosis Mother     Hypertension Father        Social History     Occupational History    Not on file   Tobacco Use    Smoking status: Current Every Day Smoker     Packs/day: 1 50     Types: Cigarettes    Smokeless tobacco: Never Used    Tobacco comment: X 40+ yrs     Substance and Sexual Activity    Alcohol use: Yes     Comment: daily ETOH in past, recently has a 6 pk of beer on weekends    Drug use: No    Sexual activity: Not on file         Current Outpatient Medications:     amitriptyline (ELAVIL) 150 MG tablet, Take 150 mg by mouth daily, Disp: , Rfl:     aspirin 81 MG tablet, Take 81 mg by mouth daily, Disp: , Rfl:     carvedilol (COREG) 6 25 mg tablet, TAKE 1 TABLET BY MOUTH TWICE A DAY WITH MEALS, Disp: 180 tablet, Rfl: 0    Cholecalciferol (VITAMIN D3) 1000 units CAPS, Take by mouth, Disp: , Rfl:    clotrimazole-betamethasone (LOTRISONE) 1-0 05 % cream, Apply topically, Disp: , Rfl:     cyanocobalamin (VITAMIN B-12) 1,000 mcg tablet, Take 1,000 mcg by mouth daily, Disp: , Rfl:     DULoxetine (CYMBALTA) 60 mg delayed release capsule, , Disp: , Rfl:     folic acid (FOLVITE) 1 mg tablet, Take 1 mg by mouth daily, Disp: , Rfl:     furosemide (LASIX) 20 mg tablet, Take 20 mg by mouth daily, Disp: , Rfl:     gabapentin (NEURONTIN) 100 mg capsule, TAKE 1 CAPSULE AT BEDTIME FOR 1 WEEK THEN INCREASE TO 2 CAPS AT BEDTIME, Disp: , Rfl:     levothyroxine 50 mcg tablet, Take 50 mcg by mouth daily, Disp: , Rfl:     Lidocaine-Menthol 4-5 % PTCH, Apply topically, Disp: , Rfl:     lisinopril (ZESTRIL) 40 mg tablet, Take 40 mg by mouth daily, Disp: , Rfl:     metFORMIN (GLUCOPHAGE) 1000 MG tablet, Take 1,000 mg by mouth 2 (two) times a day with meals, Disp: , Rfl:     metolazone (ZAROXOLYN) 5 mg tablet, 5 mg every other day, Disp: , Rfl:     simvastatin (ZOCOR) 10 mg tablet, Take 10 mg by mouth daily at bedtime, Disp: , Rfl:     nicotine (NICODERM CQ) 21 mg/24 hr TD 24 hr patch, Place 1 patch on the skin every 24 hours (Patient not taking: Reported on 12/2/2020), Disp: 28 patch, Rfl: 6    Allergies   Allergen Reactions    Penicillins Anaphylaxis and Hives     Other reaction(s): Anaphylaxis  Other reaction(s): Hives    Rocephin [Ceftriaxone] Anaphylaxis    Robaxin [Methocarbamol] Rash       Physical Exam:    /82 (BP Location: Left arm, Patient Position: Sitting, Cuff Size: Standard)   Pulse 58   Temp 98 °F (36 7 °C)   Ht 5' 10" (1 778 m)   Wt 96 2 kg (212 lb)   BMI 30 42 kg/m²     Constitutional:normal, well developed, well nourished, alert, in no distress and non-toxic and no overt pain behavior    Eyes:anicteric  HEENT:grossly intact  Neck:supple, symmetric, trachea midline and no masses   Pulmonary:even and unlabored  Cardiovascular:No edema or pitting edema present  Skin:Normal without rashes or lesions and well hydrated  Psychiatric:Mood and affect appropriate  Neurologic:Cranial Nerves II-XII grossly intact  Musculoskeletal:The patient's gait is slightly antalgic, but steady without the use of any assistive devices        Imaging  FL spine and pain procedure    (Results Pending)         Orders Placed This Encounter   Procedures    FL spine and pain procedure

## 2021-09-13 ENCOUNTER — HOSPITAL ENCOUNTER (OUTPATIENT)
Dept: RADIOLOGY | Facility: CLINIC | Age: 66
Discharge: HOME | End: 2021-09-13
Attending: ANESTHESIOLOGY | Admitting: ANESTHESIOLOGY
Payer: COMMERCIAL

## 2021-09-13 VITALS
HEART RATE: 67 BPM | SYSTOLIC BLOOD PRESSURE: 120 MMHG | RESPIRATION RATE: 20 BRPM | TEMPERATURE: 97.2 F | OXYGEN SATURATION: 96 % | DIASTOLIC BLOOD PRESSURE: 69 MMHG

## 2021-09-13 DIAGNOSIS — M54.50 CHRONIC LEFT-SIDED LOW BACK PAIN WITHOUT SCIATICA: ICD-10-CM

## 2021-09-13 DIAGNOSIS — M54.16 LUMBAR RADICULOPATHY: ICD-10-CM

## 2021-09-13 DIAGNOSIS — G89.29 CHRONIC LEFT-SIDED LOW BACK PAIN WITHOUT SCIATICA: ICD-10-CM

## 2021-09-13 DIAGNOSIS — M48.061 LUMBAR FORAMINAL STENOSIS: ICD-10-CM

## 2021-09-13 PROCEDURE — 64483 NJX AA&/STRD TFRM EPI L/S 1: CPT | Performed by: ANESTHESIOLOGY

## 2021-09-13 PROCEDURE — 64484 NJX AA&/STRD TFRM EPI L/S EA: CPT | Performed by: ANESTHESIOLOGY

## 2021-09-13 RX ORDER — PAPAVERINE HCL 150 MG
20 CAPSULE, EXTENDED RELEASE ORAL ONCE
Status: COMPLETED | OUTPATIENT
Start: 2021-09-13 | End: 2021-09-13

## 2021-09-13 RX ADMIN — DEXAMETHASONE SODIUM PHOSPHATE 20 MG: 10 INJECTION, SOLUTION INTRAMUSCULAR; INTRAVENOUS at 15:41

## 2021-09-13 RX ADMIN — IOHEXOL 2 ML: 300 INJECTION, SOLUTION INTRAVENOUS at 15:41

## 2021-09-13 RX ADMIN — LIDOCAINE HYDROCHLORIDE 2 ML: 20 INJECTION, SOLUTION EPIDURAL; INFILTRATION; INTRACAUDAL at 15:41

## 2021-09-13 NOTE — H&P
History of Present Illness: The patient is a 74 y o  male who presents with complaints of low back and leg pain      Patient Active Problem List   Diagnosis    Nonischemic cardiomyopathy ([address] )    Closed displaced fracture of proximal phalanx of ring finger    Abnormal stress test    Benign essential hypertension    Arteriosclerosis of coronary artery    Chronic pain syndrome    Diabetes mellitus type 2 with neurological manifestations (HCC)    Hyperlipemia    LBBB (left bundle branch block)    Pyogenic inflammation of bone (HCC)    Pain in joint of right hip    Right knee pain    Skin ulcer of toe of left foot with fat layer exposed ([facility] [address] )    Spinal stenosis    Spondylosis of 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    Left thigh pain    Sacroiliitis (HCC)    Chronic left-sided low back pain without sciatica    Lumbar foraminal stenosis    Lumbar radiculopathy       Past Medical History:   Diagnosis Date    Arthritis     Colon polyps     Current every day smoker     Diabetes mellitus ([facility] [address] )     type II    Drinks beer     DVT (deep venous thrombosis) ([address] ) 2008    l leg    Full dentures     Hyperlipidemia     Hypertension     Vocal cord polyps        Past Surgical History:   Procedure Laterality Date    BACK SURGERY      COLONOSCOPY      HERNIA REPAIR Right     inguinal    IVC FILTER INSERTION      JOINT REPLACEMENT      l tkr and r thr    LARYNGOSCOPY      LUMBAR SPINE SURGERY      MS AMPUTATION TOE,I-P JT Left 4/25/2017    Procedure: 2ND TOE AMPUTATION;  Surgeon: Oli Guerin DPM;  [facility]: [facility];  Service: Podiatry    MS OPEN TX PHALANGEAL SHAFT FRACTURE PROX/MIDDLE EA Left 3/28/2017    Procedure: OPEN REDUCTION W/ INTERNAL FIXATION (ORIF)PROXIMAL PHALANX RING FINGER;  Surgeon: Kay Lang MD;  [facility]: [facility];  Service: Orthopedics         Current Outpatient Medications:     amitriptyline (ELAVIL) 150 MG tablet, Take 150 mg by mouth daily, Disp: , Rfl:     aspirin 81 MG tablet, Take 81 mg by mouth daily, Disp: , Rfl:     carvedilol (COREG) 6 25 mg tablet, TAKE 1 TABLET BY MOUTH TWICE A DAY WITH MEALS, Disp: 180 tablet, Rfl: 0    Cholecalciferol (VITAMIN D3) 1000 units CAPS, Take by mouth, Disp: , Rfl:     clotrimazole-betamethasone (LOTRISONE) 1-0 05 % cream, Apply topically, Disp: , Rfl:     cyanocobalamin (VITAMIN B-12) 1,000 mcg tablet, Take 1,000 mcg by mouth daily, Disp: , Rfl:     DULoxetine (CYMBALTA) 60 mg delayed release capsule, , Disp: , Rfl:     folic acid (FOLVITE) 1 mg tablet, Take 1 mg by mouth daily, Disp: , Rfl:     furosemide (LASIX) 20 mg tablet, Take 20 mg by mouth daily, Disp: , Rfl:     gabapentin (NEURONTIN) 100 mg capsule, TAKE 1 CAPSULE AT BEDTIME FOR 1 WEEK THEN INCREASE TO 2 CAPS AT BEDTIME, Disp: , Rfl:     levothyroxine 50 mcg tablet, Take 50 mcg by mouth daily, Disp: , Rfl:     Lidocaine-Menthol 4-5 % PTCH, Apply topically, Disp: , Rfl:     lisinopril (ZESTRIL) 40 mg tablet, Take 40 mg by mouth daily, Disp: , Rfl:     metFORMIN (GLUCOPHAGE) 1000 MG tablet, Take 1,000 mg by mouth 2 (two) times a day with meals, Disp: , Rfl:     metolazone (ZAROXOLYN) 5 mg tablet, 5 mg every other day, Disp: , Rfl:     nicotine (NICODERM CQ) 21 mg/24 hr TD 24 hr patch, Place 1 patch on the skin every 24 hours (Patient not taking: Reported on 12/2/2020), Disp: 28 patch, Rfl: 6    simvastatin (ZOCOR) 10 mg tablet, Take 10 mg by mouth daily at bedtime, Disp: , Rfl:     Current Facility-Administered Medications:     dexamethasone (PF) (DECADRON) injection 20 mg, 20 mg, Epidural, Once, Joaquin Bernstein DO    iohexol (OMNIPAQUE) 300 mg/mL injection 50 mL, 50 mL, Epidural, Once, Joaquin Bernstein DO    lidocaine (PF) (XYLOCAINE-MPF) 2 % injection 5 mL, 5 mL, Epidural, Once, Joaquin Loev, DO    Allergies   Allergen Reactions    Penicillins Anaphylaxis and Hives     Other reaction(s):  Anaphylaxis  Other reaction(s): Hives    Rocephin [Ceftriaxone] Anaphylaxis    Robaxin [Methocarbamol] Rash       Physical Exam:   General: Awake, Alert, Oriented x 3, Mood and affect appropriate  Respiratory: Respirations even and unlabored  Cardiovascular: Peripheral pulses intact; no edema  Musculoskeletal Exam:  Decreased range of motion lumbar spine    ASA Score: II         Assessment:   1  Chronic left-sided low back pain without sciatica    2  Lumbar foraminal stenosis    3   Lumbar radiculopathy        Plan: Left L3 & L4 TFESI #2

## 2021-09-13 NOTE — DISCHARGE INSTRUCTIONS
Epidural Steroid Injection   WHAT YOU NEED TO KNOW:   An epidural steroid injection (HOLGER) is a procedure to inject steroid medicine into the epidural space  The epidural space is between your spinal cord and vertebrae  Steroids reduce inflammation and fluid buildup in your spine that may be causing pain  You may be given pain medicine along with the steroids  ACTIVITY  · Do not drive or operate machinery today  · No strenuous activity today - bending, lifting, etc   · You may resume normal activites starting tomorrow - start slowly and as tolerated  · You may shower today, but no tub baths or hot tubs  · You may have numbness for several hours from the local anesthetic  Please use caution and common sense, especially with weight-bearing activities  CARE OF THE INJECTION SITE  · If you have soreness or pain, apply ice to the area today (20 minutes on/20 minutes off)  · Starting tomorrow, you may use warm, moist heat or ice if needed  · You may have an increase or change in your discomfort for 36-48 hours after your treatment  · Apply ice and continue with any pain medication you have been prescribed  · Notify [facility] if you have any of the following: redness, drainage, swelling, headache, stiff neck or fever above 100°F     SPECIAL INSTRUCTIONS  · Our office will contact you in approximately 7 days for a progress report  MEDICATIONS  · Continue to take all routine medications  · Our office may have instructed you to hold some medications  As no general anesthesia was used in today's procedure, you should not experience any side effects related to anesthesia  If you have a problem specifically related to your procedure, please call our office at [phone]  Problems not related to your procedure should be directed to your primary care physician

## 2021-09-20 ENCOUNTER — TELEPHONE (OUTPATIENT)
Dept: PAIN MEDICINE | Facility: CLINIC | Age: 66
End: 2021-09-20

## 2021-09-20 NOTE — TELEPHONE ENCOUNTER
1st attempt  Lm to cb with % improvement and pain level.       S/p Left L3 & L4 TFESI #2 9/13, No F/u

## 2021-10-06 ENCOUNTER — OFFICE VISIT (OUTPATIENT)
Dept: CARDIOLOGY CLINIC | Facility: CLINIC | Age: 66
End: 2021-10-06
Payer: COMMERCIAL

## 2021-10-06 VITALS
HEART RATE: 74 BPM | BODY MASS INDEX: 31.15 KG/M2 | OXYGEN SATURATION: 99 % | HEIGHT: 70 IN | WEIGHT: 217.6 LBS | DIASTOLIC BLOOD PRESSURE: 70 MMHG | SYSTOLIC BLOOD PRESSURE: 148 MMHG

## 2021-10-06 DIAGNOSIS — I25.5 ISCHEMIC CARDIOMYOPATHY: ICD-10-CM

## 2021-10-06 DIAGNOSIS — E11.49 DIABETES MELLITUS TYPE 2 WITH NEUROLOGICAL MANIFESTATIONS (HCC): ICD-10-CM

## 2021-10-06 DIAGNOSIS — I44.7 LBBB (LEFT BUNDLE BRANCH BLOCK): ICD-10-CM

## 2021-10-06 DIAGNOSIS — I10 ESSENTIAL (PRIMARY) HYPERTENSION: Primary | ICD-10-CM

## 2021-10-06 DIAGNOSIS — E78.00 PURE HYPERCHOLESTEROLEMIA: ICD-10-CM

## 2021-10-06 PROCEDURE — 93000 ELECTROCARDIOGRAM COMPLETE: CPT | Performed by: INTERNAL MEDICINE

## 2021-10-06 PROCEDURE — 99214 OFFICE O/P EST MOD 30 MIN: CPT | Performed by: INTERNAL MEDICINE

## 2021-11-03 DIAGNOSIS — I25.5 ISCHEMIC CARDIOMYOPATHY: ICD-10-CM

## 2021-11-03 RX ORDER — CARVEDILOL 6.25 MG/1
TABLET ORAL
Qty: 180 TABLET | Refills: 0 | Status: SHIPPED | OUTPATIENT
Start: 2021-11-03 | End: 2022-01-27

## 2021-11-16 ENCOUNTER — TELEPHONE (OUTPATIENT)
Dept: CARDIOLOGY CLINIC | Facility: CLINIC | Age: 66
End: 2021-11-16

## 2021-11-22 ENCOUNTER — HOSPITAL ENCOUNTER (EMERGENCY)
Facility: HOSPITAL | Age: 66
Discharge: HOME | DRG: 291 | End: 2021-11-22
Attending: EMERGENCY MEDICINE | Admitting: EMERGENCY MEDICINE
Payer: COMMERCIAL

## 2021-11-22 VITALS
RESPIRATION RATE: 16 BRPM | DIASTOLIC BLOOD PRESSURE: 87 MMHG | BODY MASS INDEX: 30.78 KG/M2 | HEART RATE: 84 BPM | OXYGEN SATURATION: 99 % | TEMPERATURE: 97.4 F | WEIGHT: 215 LBS | HEIGHT: 70 IN | SYSTOLIC BLOOD PRESSURE: 152 MMHG

## 2021-11-22 DIAGNOSIS — M79.671 FOOT PAIN, RIGHT: ICD-10-CM

## 2021-11-22 DIAGNOSIS — M54.9 BACK PAIN: Primary | ICD-10-CM

## 2021-11-22 PROCEDURE — 99285 EMERGENCY DEPT VISIT HI MDM: CPT | Performed by: EMERGENCY MEDICINE

## 2021-11-22 PROCEDURE — 99283 EMERGENCY DEPT VISIT LOW MDM: CPT

## 2021-11-22 RX ORDER — OXYCODONE HYDROCHLORIDE AND ACETAMINOPHEN 5; 325 MG/1; MG/1
1 TABLET ORAL EVERY 6 HOURS PRN
Qty: 10 TABLET | Refills: 0 | Status: SHIPPED | OUTPATIENT
Start: 2021-11-22 | End: 2021-11-24

## 2021-11-22 RX ORDER — MORPHINE SULFATE 4 MG/ML
4 INJECTION, SOLUTION INTRAMUSCULAR; INTRAVENOUS ONCE
Status: COMPLETED | OUTPATIENT
Start: 2021-11-22 | End: 2021-11-22

## 2021-11-22 RX ADMIN — MORPHINE SULFATE 4 MG: 4 INJECTION INTRAVENOUS at 08:37

## 2021-11-23 ENCOUNTER — APPOINTMENT (EMERGENCY)
Dept: RADIOLOGY | Facility: HOSPITAL | Age: 66
DRG: 291 | End: 2021-11-23
Payer: COMMERCIAL

## 2021-11-23 ENCOUNTER — HOSPITAL ENCOUNTER (INPATIENT)
Facility: HOSPITAL | Age: 66
LOS: 1 days | Discharge: HOME HEALTH | DRG: 291 | End: 2021-11-25
Attending: EMERGENCY MEDICINE | Admitting: INTERNAL MEDICINE
Payer: COMMERCIAL

## 2021-11-23 DIAGNOSIS — I50.9 CHF (CONGESTIVE HEART FAILURE) (HCC): ICD-10-CM

## 2021-11-23 DIAGNOSIS — G89.29 CHRONIC BACK PAIN: ICD-10-CM

## 2021-11-23 DIAGNOSIS — M47.816 SPONDYLOSIS OF LUMBAR REGION WITHOUT MYELOPATHY OR RADICULOPATHY: ICD-10-CM

## 2021-11-23 DIAGNOSIS — J20.9 ACUTE BRONCHITIS: ICD-10-CM

## 2021-11-23 DIAGNOSIS — M54.16 LUMBAR RADICULOPATHY: ICD-10-CM

## 2021-11-23 DIAGNOSIS — M54.9 CHRONIC BACK PAIN: ICD-10-CM

## 2021-11-23 DIAGNOSIS — R55 NEAR SYNCOPE: ICD-10-CM

## 2021-11-23 DIAGNOSIS — M46.1 SACROILIITIS (HCC): ICD-10-CM

## 2021-11-23 DIAGNOSIS — I50.43 ACUTE ON CHRONIC COMBINED SYSTOLIC AND DIASTOLIC HEART FAILURE (HCC): ICD-10-CM

## 2021-11-23 DIAGNOSIS — I42.8 NONISCHEMIC CARDIOMYOPATHY (HCC): ICD-10-CM

## 2021-11-23 DIAGNOSIS — M48.061 LUMBAR FORAMINAL STENOSIS: ICD-10-CM

## 2021-11-23 DIAGNOSIS — N17.9 AKI (ACUTE KIDNEY INJURY) (HCC): Primary | ICD-10-CM

## 2021-11-23 DIAGNOSIS — E86.0 DEHYDRATION: ICD-10-CM

## 2021-11-23 LAB
2HR DELTA HS TROPONIN: 2 NG/L
4HR DELTA HS TROPONIN: -1 NG/L
ALBUMIN SERPL BCP-MCNC: 4 G/DL (ref 3.5–5)
ALP SERPL-CCNC: 94 U/L (ref 46–116)
ALT SERPL W P-5'-P-CCNC: 39 U/L (ref 12–78)
ANION GAP SERPL CALCULATED.3IONS-SCNC: 14 MMOL/L (ref 4–13)
AST SERPL W P-5'-P-CCNC: 23 U/L (ref 5–45)
BASOPHILS # BLD AUTO: 0.06 THOUSANDS/ΜL (ref 0–0.1)
BASOPHILS NFR BLD AUTO: 1 % (ref 0–1)
BILIRUB SERPL-MCNC: 0.7 MG/DL (ref 0.2–1)
BUN SERPL-MCNC: 19 MG/DL (ref 5–25)
CALCIUM SERPL-MCNC: 10.2 MG/DL (ref 8.3–10.1)
CARDIAC TROPONIN I PNL SERPL HS: 24 NG/L
CARDIAC TROPONIN I PNL SERPL HS: 25 NG/L
CARDIAC TROPONIN I PNL SERPL HS: 27 NG/L
CHLORIDE SERPL-SCNC: 95 MMOL/L (ref 100–108)
CO2 SERPL-SCNC: 24 MMOL/L (ref 21–32)
CREAT SERPL-MCNC: 1.58 MG/DL (ref 0.6–1.3)
EOSINOPHIL # BLD AUTO: 0.39 THOUSAND/ΜL (ref 0–0.61)
EOSINOPHIL NFR BLD AUTO: 5 % (ref 0–6)
ERYTHROCYTE [DISTWIDTH] IN BLOOD BY AUTOMATED COUNT: 12.8 % (ref 11.6–15.1)
GFR SERPL CREATININE-BSD FRML MDRD: 44 ML/MIN/1.73SQ M
GLUCOSE SERPL-MCNC: 132 MG/DL (ref 65–140)
HCT VFR BLD AUTO: 43.1 % (ref 36.5–49.3)
HGB BLD-MCNC: 14.6 G/DL (ref 12–17)
IMM GRANULOCYTES # BLD AUTO: 0.03 THOUSAND/UL (ref 0–0.2)
IMM GRANULOCYTES NFR BLD AUTO: 0 % (ref 0–2)
LYMPHOCYTES # BLD AUTO: 1.6 THOUSANDS/ΜL (ref 0.6–4.47)
LYMPHOCYTES NFR BLD AUTO: 20 % (ref 14–44)
MCH RBC QN AUTO: 33 PG (ref 26.8–34.3)
MCHC RBC AUTO-ENTMCNC: 33.9 G/DL (ref 31.4–37.4)
MCV RBC AUTO: 98 FL (ref 82–98)
MONOCYTES # BLD AUTO: 0.9 THOUSAND/ΜL (ref 0.17–1.22)
MONOCYTES NFR BLD AUTO: 11 % (ref 4–12)
NEUTROPHILS # BLD AUTO: 4.9 THOUSANDS/ΜL (ref 1.85–7.62)
NEUTS SEG NFR BLD AUTO: 63 % (ref 43–75)
NRBC BLD AUTO-RTO: 0 /100 WBCS
PLATELET # BLD AUTO: 237 THOUSANDS/UL (ref 149–390)
PMV BLD AUTO: 9.5 FL (ref 8.9–12.7)
POTASSIUM SERPL-SCNC: 3.9 MMOL/L (ref 3.5–5.3)
PROT SERPL-MCNC: 8.1 G/DL (ref 6.4–8.2)
RBC # BLD AUTO: 4.42 MILLION/UL (ref 3.88–5.62)
SODIUM SERPL-SCNC: 133 MMOL/L (ref 136–145)
WBC # BLD AUTO: 7.88 THOUSAND/UL (ref 4.31–10.16)

## 2021-11-23 PROCEDURE — 96360 HYDRATION IV INFUSION INIT: CPT

## 2021-11-23 PROCEDURE — 80053 COMPREHEN METABOLIC PANEL: CPT | Performed by: EMERGENCY MEDICINE

## 2021-11-23 PROCEDURE — 99285 EMERGENCY DEPT VISIT HI MDM: CPT | Performed by: EMERGENCY MEDICINE

## 2021-11-23 PROCEDURE — 83880 ASSAY OF NATRIURETIC PEPTIDE: CPT | Performed by: INTERNAL MEDICINE

## 2021-11-23 PROCEDURE — 80048 BASIC METABOLIC PNL TOTAL CA: CPT | Performed by: EMERGENCY MEDICINE

## 2021-11-23 PROCEDURE — 36415 COLL VENOUS BLD VENIPUNCTURE: CPT

## 2021-11-23 PROCEDURE — 71046 X-RAY EXAM CHEST 2 VIEWS: CPT

## 2021-11-23 PROCEDURE — 85025 COMPLETE CBC W/AUTO DIFF WBC: CPT | Performed by: EMERGENCY MEDICINE

## 2021-11-23 PROCEDURE — 93005 ELECTROCARDIOGRAM TRACING: CPT

## 2021-11-23 PROCEDURE — 96361 HYDRATE IV INFUSION ADD-ON: CPT

## 2021-11-23 PROCEDURE — 84484 ASSAY OF TROPONIN QUANT: CPT | Performed by: EMERGENCY MEDICINE

## 2021-11-23 PROCEDURE — 85049 AUTOMATED PLATELET COUNT: CPT | Performed by: INTERNAL MEDICINE

## 2021-11-23 PROCEDURE — 99285 EMERGENCY DEPT VISIT HI MDM: CPT

## 2021-11-23 RX ORDER — LIDOCAINE 50 MG/G
1 PATCH TOPICAL ONCE
Status: COMPLETED | OUTPATIENT
Start: 2021-11-23 | End: 2021-11-24

## 2021-11-23 RX ORDER — ALBUTEROL SULFATE 90 UG/1
1-2 AEROSOL, METERED RESPIRATORY (INHALATION) EVERY 6 HOURS PRN
Qty: 8 G | Refills: 0 | Status: SHIPPED | OUTPATIENT
Start: 2021-11-23

## 2021-11-23 RX ORDER — ALBUTEROL SULFATE 90 UG/1
2 AEROSOL, METERED RESPIRATORY (INHALATION) ONCE
Status: COMPLETED | OUTPATIENT
Start: 2021-11-23 | End: 2021-11-23

## 2021-11-23 RX ORDER — OXYCODONE HYDROCHLORIDE 5 MG/1
5 TABLET ORAL ONCE
Status: COMPLETED | OUTPATIENT
Start: 2021-11-23 | End: 2021-11-23

## 2021-11-23 RX ORDER — ACETAMINOPHEN 325 MG/1
975 TABLET ORAL ONCE
Status: COMPLETED | OUTPATIENT
Start: 2021-11-23 | End: 2021-11-23

## 2021-11-23 RX ADMIN — LIDOCAINE 5% 1 PATCH: 700 PATCH TOPICAL at 20:10

## 2021-11-23 RX ADMIN — ACETAMINOPHEN 975 MG: 325 TABLET, FILM COATED ORAL at 20:09

## 2021-11-23 RX ADMIN — SODIUM CHLORIDE 1000 ML: 0.9 INJECTION, SOLUTION INTRAVENOUS at 19:57

## 2021-11-23 RX ADMIN — OXYCODONE HYDROCHLORIDE 5 MG: 5 TABLET ORAL at 20:09

## 2021-11-23 RX ADMIN — ALBUTEROL SULFATE 2 PUFF: 90 AEROSOL, METERED RESPIRATORY (INHALATION) at 20:09

## 2021-11-24 ENCOUNTER — APPOINTMENT (OUTPATIENT)
Dept: NON INVASIVE DIAGNOSTICS | Facility: HOSPITAL | Age: 66
DRG: 291 | End: 2021-11-24
Payer: COMMERCIAL

## 2021-11-24 PROBLEM — I50.43 ACUTE ON CHRONIC COMBINED SYSTOLIC AND DIASTOLIC HEART FAILURE (HCC): Status: ACTIVE | Noted: 2021-11-24

## 2021-11-24 PROBLEM — N17.9 AKI (ACUTE KIDNEY INJURY) (HCC): Status: ACTIVE | Noted: 2021-11-24

## 2021-11-24 PROBLEM — Z72.0 TOBACCO ABUSE: Status: ACTIVE | Noted: 2021-11-24

## 2021-11-24 PROBLEM — I50.33 ACUTE ON CHRONIC DIASTOLIC (CONGESTIVE) HEART FAILURE (HCC): Status: ACTIVE | Noted: 2021-11-24

## 2021-11-24 LAB
ANION GAP SERPL CALCULATED.3IONS-SCNC: 11 MMOL/L (ref 4–13)
ANION GAP SERPL CALCULATED.3IONS-SCNC: 19 MMOL/L (ref 4–13)
AORTIC ROOT: 4.5 CM
AORTIC VALVE MEAN VELOCITY: 8.3 M/S
APICAL FOUR CHAMBER EJECTION FRACTION: 37 %
ATRIAL RATE: 63 BPM
AV LVOT MEAN GRADIENT: 1 MMHG
AV LVOT PEAK GRADIENT: 2 MMHG
AV MEAN GRADIENT: 3 MMHG
AV PEAK GRADIENT: 5 MMHG
BACTERIA UR QL AUTO: ABNORMAL /HPF
BASOPHILS # BLD AUTO: 0.04 THOUSANDS/ΜL (ref 0–0.1)
BASOPHILS NFR BLD AUTO: 1 % (ref 0–1)
BILIRUB UR QL STRIP: NEGATIVE
BUN SERPL-MCNC: 22 MG/DL (ref 5–25)
BUN SERPL-MCNC: 23 MG/DL (ref 5–25)
CALCIUM SERPL-MCNC: 8.8 MG/DL (ref 8.3–10.1)
CALCIUM SERPL-MCNC: 9.6 MG/DL (ref 8.3–10.1)
CHLORIDE SERPL-SCNC: 89 MMOL/L (ref 100–108)
CHLORIDE SERPL-SCNC: 99 MMOL/L (ref 100–108)
CLARITY UR: CLEAR
CO2 SERPL-SCNC: 25 MMOL/L (ref 21–32)
CO2 SERPL-SCNC: 26 MMOL/L (ref 21–32)
COLOR UR: YELLOW
CREAT SERPL-MCNC: 1.87 MG/DL (ref 0.6–1.3)
CREAT SERPL-MCNC: 1.94 MG/DL (ref 0.6–1.3)
DOP CALC AO VTI: 22.47 CM
DOP CALC LVOT PEAK VEL VTI: 11.91 CM
DOP CALC LVOT PEAK VEL: 0.63 M/S
E WAVE DECELERATION TIME: 264 MS
E/A RATIO: 0.47
EOSINOPHIL # BLD AUTO: 0.38 THOUSAND/ΜL (ref 0–0.61)
EOSINOPHIL NFR BLD AUTO: 6 % (ref 0–6)
ERYTHROCYTE [DISTWIDTH] IN BLOOD BY AUTOMATED COUNT: 12.9 % (ref 11.6–15.1)
FRACTIONAL SHORTENING: 19 (ref 28–44)
GFR SERPL CREATININE-BSD FRML MDRD: 35 ML/MIN/1.73SQ M
GFR SERPL CREATININE-BSD FRML MDRD: 36 ML/MIN/1.73SQ M
GLUCOSE P FAST SERPL-MCNC: 110 MG/DL (ref 65–99)
GLUCOSE SERPL-MCNC: 104 MG/DL (ref 65–140)
GLUCOSE SERPL-MCNC: 110 MG/DL (ref 65–140)
GLUCOSE SERPL-MCNC: 112 MG/DL (ref 65–140)
GLUCOSE SERPL-MCNC: 132 MG/DL (ref 65–140)
GLUCOSE SERPL-MCNC: 168 MG/DL (ref 65–140)
GLUCOSE SERPL-MCNC: 90 MG/DL (ref 65–140)
GLUCOSE UR STRIP-MCNC: NEGATIVE MG/DL
HCT VFR BLD AUTO: 35.9 % (ref 36.5–49.3)
HGB BLD-MCNC: 12.1 G/DL (ref 12–17)
HGB UR QL STRIP.AUTO: NEGATIVE
HYALINE CASTS #/AREA URNS LPF: ABNORMAL /LPF
IMM GRANULOCYTES # BLD AUTO: 0.02 THOUSAND/UL (ref 0–0.2)
IMM GRANULOCYTES NFR BLD AUTO: 0 % (ref 0–2)
INTERVENTRICULAR SEPTUM IN DIASTOLE (PARASTERNAL SHORT AXIS VIEW): 1.5 CM
KETONES UR STRIP-MCNC: NEGATIVE MG/DL
LAAS-AP2: 29.4 CM2
LAAS-AP4: 19.1 CM2
LEFT ATRIUM VOLUME INDEX (MOD BIPLANE): 36.3
LEFT INTERNAL DIMENSION IN SYSTOLE: 5 CM (ref 2.1–4)
LEFT VENTRICLE DIASTOLIC VOLUME (MOD BIPLANE): 191 ML
LEFT VENTRICLE SYSTOLIC VOLUME (MOD BIPLANE): 130 ML
LEFT VENTRICULAR INTERNAL DIMENSION IN DIASTOLE: 6.2 CM (ref 6.35–9.47)
LEFT VENTRICULAR POSTERIOR WALL IN END DIASTOLE: 0.9 CM
LEFT VENTRICULAR STROKE VOLUME: 72 ML
LEUKOCYTE ESTERASE UR QL STRIP: NEGATIVE
LV EF: 32 %
LYMPHOCYTES # BLD AUTO: 1.73 THOUSANDS/ΜL (ref 0.6–4.47)
LYMPHOCYTES NFR BLD AUTO: 28 % (ref 14–44)
MCH RBC QN AUTO: 33.1 PG (ref 26.8–34.3)
MCHC RBC AUTO-ENTMCNC: 33.7 G/DL (ref 31.4–37.4)
MCV RBC AUTO: 98 FL (ref 82–98)
MONOCYTES # BLD AUTO: 0.65 THOUSAND/ΜL (ref 0.17–1.22)
MONOCYTES NFR BLD AUTO: 11 % (ref 4–12)
MV E'TISSUE VEL-LAT: 4 CM/S
MV E'TISSUE VEL-SEP: 4 CM/S
MV PEAK A VEL: 0.95 M/S
MV PEAK E VEL: 45 CM/S
MV STENOSIS PRESSURE HALF TIME: 0 MS
MV VALVE AREA P 1/2 METHOD: 2.9
NEUTROPHILS # BLD AUTO: 3.38 THOUSANDS/ΜL (ref 1.85–7.62)
NEUTS SEG NFR BLD AUTO: 54 % (ref 43–75)
NITRITE UR QL STRIP: NEGATIVE
NON-SQ EPI CELLS URNS QL MICRO: ABNORMAL /HPF
NRBC BLD AUTO-RTO: 0 /100 WBCS
NT-PROBNP SERPL-MCNC: 2779 PG/ML
P AXIS: -3 DEGREES
PA SYSTOLIC PRESSURE: 20 MMHG
PH UR STRIP.AUTO: 5.5 [PH]
PLATELET # BLD AUTO: 162 THOUSANDS/UL (ref 149–390)
PLATELET # BLD AUTO: 237 THOUSANDS/UL (ref 149–390)
PMV BLD AUTO: 9.4 FL (ref 8.9–12.7)
PMV BLD AUTO: 9.5 FL (ref 8.9–12.7)
POTASSIUM SERPL-SCNC: 3.5 MMOL/L (ref 3.5–5.3)
POTASSIUM SERPL-SCNC: 3.6 MMOL/L (ref 3.5–5.3)
PR INTERVAL: 156 MS
PROT UR STRIP-MCNC: NEGATIVE MG/DL
QRS AXIS: 21 DEGREES
QRSD INTERVAL: 172 MS
QT INTERVAL: 464 MS
QTC INTERVAL: 474 MS
RBC # BLD AUTO: 3.66 MILLION/UL (ref 3.88–5.62)
RBC #/AREA URNS AUTO: ABNORMAL /HPF
RIGHT ATRIAL 2D VOLUME: 25 ML
RIGHT ATRIUM AREA SYSTOLE A4C: 12.6 CM2
RIGHT VENTRICLE ID DIMENSION: 3.8 CM
SL CV LV EF: 40
SL CV PED ECHO LEFT VENTRICLE DIASTOLIC VOLUME (MOD BIPLANE) 2D: 192 ML
SL CV PED ECHO LEFT VENTRICLE SYSTOLIC VOLUME (MOD BIPLANE) 2D: 120 ML
SODIUM SERPL-SCNC: 134 MMOL/L (ref 136–145)
SODIUM SERPL-SCNC: 135 MMOL/L (ref 136–145)
SP GR UR STRIP.AUTO: 1.01 (ref 1–1.03)
T WAVE AXIS: 179 DEGREES
TRICUSPID VALVE PEAK REGURGITATION VELOCITY: 2 M/S
TRICUSPID VALVE S': 56 CM/S
TV PEAK GRADIENT: 16 MMHG
UROBILINOGEN UR QL STRIP.AUTO: 0.2 E.U./DL
VENTRICULAR RATE: 63 BPM
WBC # BLD AUTO: 6.2 THOUSAND/UL (ref 4.31–10.16)
WBC #/AREA URNS AUTO: ABNORMAL /HPF

## 2021-11-24 PROCEDURE — 36415 COLL VENOUS BLD VENIPUNCTURE: CPT | Performed by: INTERNAL MEDICINE

## 2021-11-24 PROCEDURE — 93010 ELECTROCARDIOGRAM REPORT: CPT | Performed by: INTERNAL MEDICINE

## 2021-11-24 PROCEDURE — 81001 URINALYSIS AUTO W/SCOPE: CPT | Performed by: INTERNAL MEDICINE

## 2021-11-24 PROCEDURE — 97162 PT EVAL MOD COMPLEX 30 MIN: CPT

## 2021-11-24 PROCEDURE — 99220 PR INITIAL OBSERVATION CARE/DAY 70 MINUTES: CPT | Performed by: INTERNAL MEDICINE

## 2021-11-24 PROCEDURE — 93306 TTE W/DOPPLER COMPLETE: CPT

## 2021-11-24 PROCEDURE — 93306 TTE W/DOPPLER COMPLETE: CPT | Performed by: INTERNAL MEDICINE

## 2021-11-24 PROCEDURE — 99222 1ST HOSP IP/OBS MODERATE 55: CPT | Performed by: INTERNAL MEDICINE

## 2021-11-24 PROCEDURE — 93356 MYOCRD STRAIN IMG SPCKL TRCK: CPT

## 2021-11-24 PROCEDURE — 97165 OT EVAL LOW COMPLEX 30 MIN: CPT

## 2021-11-24 PROCEDURE — 93356 MYOCRD STRAIN IMG SPCKL TRCK: CPT | Performed by: INTERNAL MEDICINE

## 2021-11-24 PROCEDURE — 85025 COMPLETE CBC W/AUTO DIFF WBC: CPT | Performed by: INTERNAL MEDICINE

## 2021-11-24 PROCEDURE — 82948 REAGENT STRIP/BLOOD GLUCOSE: CPT

## 2021-11-24 PROCEDURE — 80048 BASIC METABOLIC PNL TOTAL CA: CPT | Performed by: INTERNAL MEDICINE

## 2021-11-24 RX ORDER — HEPARIN SODIUM 5000 [USP'U]/ML
5000 INJECTION, SOLUTION INTRAVENOUS; SUBCUTANEOUS EVERY 8 HOURS SCHEDULED
Status: DISCONTINUED | OUTPATIENT
Start: 2021-11-24 | End: 2021-11-25 | Stop reason: HOSPADM

## 2021-11-24 RX ORDER — NICOTINE 21 MG/24HR
21 PATCH, TRANSDERMAL 24 HOURS TRANSDERMAL DAILY
Status: DISCONTINUED | OUTPATIENT
Start: 2021-11-24 | End: 2021-11-25 | Stop reason: HOSPADM

## 2021-11-24 RX ORDER — PRAVASTATIN SODIUM 20 MG
20 TABLET ORAL
Status: DISCONTINUED | OUTPATIENT
Start: 2021-11-24 | End: 2021-11-25 | Stop reason: HOSPADM

## 2021-11-24 RX ORDER — FUROSEMIDE 40 MG/1
40 TABLET ORAL DAILY
Status: DISCONTINUED | OUTPATIENT
Start: 2021-11-25 | End: 2021-11-25 | Stop reason: HOSPADM

## 2021-11-24 RX ORDER — POTASSIUM CHLORIDE 20 MEQ/1
40 TABLET, EXTENDED RELEASE ORAL ONCE
Status: COMPLETED | OUTPATIENT
Start: 2021-11-24 | End: 2021-11-24

## 2021-11-24 RX ORDER — DULOXETIN HYDROCHLORIDE 30 MG/1
60 CAPSULE, DELAYED RELEASE ORAL DAILY
Status: DISCONTINUED | OUTPATIENT
Start: 2021-11-24 | End: 2021-11-25 | Stop reason: HOSPADM

## 2021-11-24 RX ORDER — ASPIRIN 81 MG/1
81 TABLET, CHEWABLE ORAL DAILY
Status: DISCONTINUED | OUTPATIENT
Start: 2021-11-24 | End: 2021-11-25 | Stop reason: HOSPADM

## 2021-11-24 RX ORDER — ONDANSETRON 2 MG/ML
4 INJECTION INTRAMUSCULAR; INTRAVENOUS EVERY 6 HOURS PRN
Status: DISCONTINUED | OUTPATIENT
Start: 2021-11-24 | End: 2021-11-25 | Stop reason: HOSPADM

## 2021-11-24 RX ORDER — FUROSEMIDE 10 MG/ML
20 INJECTION INTRAMUSCULAR; INTRAVENOUS
Status: COMPLETED | OUTPATIENT
Start: 2021-11-24 | End: 2021-11-24

## 2021-11-24 RX ORDER — ACETAMINOPHEN 325 MG/1
650 TABLET ORAL EVERY 6 HOURS PRN
Status: DISCONTINUED | OUTPATIENT
Start: 2021-11-24 | End: 2021-11-25 | Stop reason: HOSPADM

## 2021-11-24 RX ORDER — FUROSEMIDE 40 MG/1
40 TABLET ORAL DAILY
Qty: 30 TABLET | Refills: 6 | Status: SHIPPED | OUTPATIENT
Start: 2021-11-25 | End: 2021-11-25

## 2021-11-24 RX ORDER — ALBUTEROL SULFATE 2.5 MG/3ML
2.5 SOLUTION RESPIRATORY (INHALATION) EVERY 4 HOURS PRN
Status: DISCONTINUED | OUTPATIENT
Start: 2021-11-24 | End: 2021-11-25 | Stop reason: HOSPADM

## 2021-11-24 RX ORDER — FOLIC ACID 1 MG/1
1 TABLET ORAL DAILY
Status: DISCONTINUED | OUTPATIENT
Start: 2021-11-24 | End: 2021-11-25 | Stop reason: HOSPADM

## 2021-11-24 RX ORDER — LEVOTHYROXINE SODIUM 0.03 MG/1
50 TABLET ORAL
Status: DISCONTINUED | OUTPATIENT
Start: 2021-11-24 | End: 2021-11-25 | Stop reason: HOSPADM

## 2021-11-24 RX ORDER — AMITRIPTYLINE HYDROCHLORIDE 50 MG/1
150 TABLET, FILM COATED ORAL DAILY
Status: DISCONTINUED | OUTPATIENT
Start: 2021-11-24 | End: 2021-11-25 | Stop reason: HOSPADM

## 2021-11-24 RX ORDER — ALBUMIN, HUMAN INJ 5% 5 %
12.5 SOLUTION INTRAVENOUS ONCE
Status: COMPLETED | OUTPATIENT
Start: 2021-11-24 | End: 2021-11-24

## 2021-11-24 RX ORDER — CARVEDILOL 3.12 MG/1
6.25 TABLET ORAL 2 TIMES DAILY WITH MEALS
Status: DISCONTINUED | OUTPATIENT
Start: 2021-11-24 | End: 2021-11-25 | Stop reason: HOSPADM

## 2021-11-24 RX ADMIN — THIAMINE HYDROCHLORIDE 100 MG: 100 INJECTION, SOLUTION INTRAMUSCULAR; INTRAVENOUS at 08:06

## 2021-11-24 RX ADMIN — ALBUMIN (HUMAN) 12.5 G: 12.5 INJECTION, SOLUTION INTRAVENOUS at 05:30

## 2021-11-24 RX ADMIN — HEPARIN SODIUM 5000 UNITS: 5000 INJECTION INTRAVENOUS; SUBCUTANEOUS at 13:56

## 2021-11-24 RX ADMIN — POTASSIUM CHLORIDE 40 MEQ: 1500 TABLET, EXTENDED RELEASE ORAL at 13:56

## 2021-11-24 RX ADMIN — DULOXETINE 60 MG: 30 CAPSULE, DELAYED RELEASE ORAL at 08:02

## 2021-11-24 RX ADMIN — LEVOTHYROXINE SODIUM 50 MCG: 25 TABLET ORAL at 05:44

## 2021-11-24 RX ADMIN — ASPIRIN 81 MG CHEWABLE TABLET 81 MG: 81 TABLET CHEWABLE at 08:02

## 2021-11-24 RX ADMIN — HEPARIN SODIUM 5000 UNITS: 5000 INJECTION INTRAVENOUS; SUBCUTANEOUS at 05:30

## 2021-11-24 RX ADMIN — CARVEDILOL 6.25 MG: 12.5 TABLET, FILM COATED ORAL at 17:05

## 2021-11-24 RX ADMIN — CYANOCOBALAMIN TAB 500 MCG 1000 MCG: 500 TAB at 08:02

## 2021-11-24 RX ADMIN — ACETAMINOPHEN 650 MG: 325 TABLET, FILM COATED ORAL at 20:49

## 2021-11-24 RX ADMIN — FUROSEMIDE 20 MG: 10 INJECTION, SOLUTION INTRAMUSCULAR; INTRAVENOUS at 17:05

## 2021-11-24 RX ADMIN — SODIUM CHLORIDE 1000 ML: 0.9 INJECTION, SOLUTION INTRAVENOUS at 01:24

## 2021-11-24 RX ADMIN — HEPARIN SODIUM 5000 UNITS: 5000 INJECTION INTRAVENOUS; SUBCUTANEOUS at 21:35

## 2021-11-24 RX ADMIN — FUROSEMIDE 20 MG: 10 INJECTION, SOLUTION INTRAMUSCULAR; INTRAVENOUS at 08:02

## 2021-11-24 RX ADMIN — NICOTINE 21 MG: 21 PATCH, EXTENDED RELEASE TRANSDERMAL at 01:25

## 2021-11-24 RX ADMIN — NICOTINE 21 MG: 21 PATCH, EXTENDED RELEASE TRANSDERMAL at 08:08

## 2021-11-24 RX ADMIN — FUROSEMIDE 20 MG: 10 INJECTION, SOLUTION INTRAMUSCULAR; INTRAVENOUS at 05:30

## 2021-11-24 RX ADMIN — PRAVASTATIN SODIUM 20 MG: 20 TABLET ORAL at 17:05

## 2021-11-24 RX ADMIN — AMITRIPTYLINE HYDROCHLORIDE 150 MG: 50 TABLET, FILM COATED ORAL at 08:02

## 2021-11-24 RX ADMIN — ACETAMINOPHEN 650 MG: 325 TABLET, FILM COATED ORAL at 08:02

## 2021-11-24 RX ADMIN — FOLIC ACID 1 MG: 1 TABLET ORAL at 08:03

## 2021-11-24 RX ADMIN — INSULIN LISPRO 1 UNITS: 100 INJECTION, SOLUTION INTRAVENOUS; SUBCUTANEOUS at 12:04

## 2021-11-24 RX ADMIN — CARVEDILOL 6.25 MG: 12.5 TABLET, FILM COATED ORAL at 06:43

## 2021-11-25 VITALS
WEIGHT: 207.7 LBS | OXYGEN SATURATION: 93 % | RESPIRATION RATE: 18 BRPM | HEART RATE: 60 BPM | HEIGHT: 70 IN | TEMPERATURE: 97.4 F | BODY MASS INDEX: 29.73 KG/M2 | DIASTOLIC BLOOD PRESSURE: 68 MMHG | SYSTOLIC BLOOD PRESSURE: 116 MMHG

## 2021-11-25 PROBLEM — N17.9 AKI (ACUTE KIDNEY INJURY) (HCC): Status: RESOLVED | Noted: 2021-11-24 | Resolved: 2021-11-25

## 2021-11-25 LAB
ANION GAP SERPL CALCULATED.3IONS-SCNC: 11 MMOL/L (ref 4–13)
BUN SERPL-MCNC: 31 MG/DL (ref 5–25)
CALCIUM SERPL-MCNC: 9.5 MG/DL (ref 8.3–10.1)
CHLORIDE SERPL-SCNC: 101 MMOL/L (ref 100–108)
CO2 SERPL-SCNC: 25 MMOL/L (ref 21–32)
CREAT SERPL-MCNC: 1.19 MG/DL (ref 0.6–1.3)
GFR SERPL CREATININE-BSD FRML MDRD: 62 ML/MIN/1.73SQ M
GLUCOSE SERPL-MCNC: 124 MG/DL (ref 65–140)
GLUCOSE SERPL-MCNC: 174 MG/DL (ref 65–140)
POTASSIUM SERPL-SCNC: 3.9 MMOL/L (ref 3.5–5.3)
SODIUM SERPL-SCNC: 137 MMOL/L (ref 136–145)

## 2021-11-25 PROCEDURE — 99239 HOSP IP/OBS DSCHRG MGMT >30: CPT | Performed by: INTERNAL MEDICINE

## 2021-11-25 PROCEDURE — 82948 REAGENT STRIP/BLOOD GLUCOSE: CPT

## 2021-11-25 PROCEDURE — 80048 BASIC METABOLIC PNL TOTAL CA: CPT | Performed by: INTERNAL MEDICINE

## 2021-11-25 RX ORDER — OXYCODONE HYDROCHLORIDE 5 MG/1
5 TABLET ORAL ONCE
Status: COMPLETED | OUTPATIENT
Start: 2021-11-25 | End: 2021-11-25

## 2021-11-25 RX ORDER — FUROSEMIDE 40 MG/1
40 TABLET ORAL DAILY
Qty: 30 TABLET | Refills: 6 | Status: SHIPPED | OUTPATIENT
Start: 2021-11-25

## 2021-11-25 RX ORDER — OXYCODONE HYDROCHLORIDE 5 MG/1
5 TABLET ORAL EVERY 8 HOURS PRN
Qty: 15 TABLET | Refills: 0 | Status: SHIPPED | OUTPATIENT
Start: 2021-11-25 | End: 2021-12-02

## 2021-11-25 RX ADMIN — FOLIC ACID 1 MG: 1 TABLET ORAL at 08:22

## 2021-11-25 RX ADMIN — ACETAMINOPHEN 650 MG: 325 TABLET, FILM COATED ORAL at 02:50

## 2021-11-25 RX ADMIN — THIAMINE HYDROCHLORIDE 100 MG: 100 INJECTION, SOLUTION INTRAMUSCULAR; INTRAVENOUS at 08:25

## 2021-11-25 RX ADMIN — INSULIN LISPRO 1 UNITS: 100 INJECTION, SOLUTION INTRAVENOUS; SUBCUTANEOUS at 08:23

## 2021-11-25 RX ADMIN — LEVOTHYROXINE SODIUM 50 MCG: 25 TABLET ORAL at 05:01

## 2021-11-25 RX ADMIN — ASPIRIN 81 MG CHEWABLE TABLET 81 MG: 81 TABLET CHEWABLE at 08:22

## 2021-11-25 RX ADMIN — DULOXETINE 60 MG: 30 CAPSULE, DELAYED RELEASE ORAL at 08:22

## 2021-11-25 RX ADMIN — OXYCODONE HYDROCHLORIDE 5 MG: 5 TABLET ORAL at 05:01

## 2021-11-25 RX ADMIN — CARVEDILOL 6.25 MG: 12.5 TABLET, FILM COATED ORAL at 08:22

## 2021-11-25 RX ADMIN — FUROSEMIDE 40 MG: 40 TABLET ORAL at 08:22

## 2021-11-25 RX ADMIN — CYANOCOBALAMIN TAB 500 MCG 1000 MCG: 500 TAB at 08:22

## 2021-11-25 RX ADMIN — HEPARIN SODIUM 5000 UNITS: 5000 INJECTION INTRAVENOUS; SUBCUTANEOUS at 05:01

## 2021-11-25 RX ADMIN — AMITRIPTYLINE HYDROCHLORIDE 150 MG: 50 TABLET, FILM COATED ORAL at 08:22

## 2021-11-25 RX ADMIN — NICOTINE 21 MG: 21 PATCH, EXTENDED RELEASE TRANSDERMAL at 08:23

## 2021-12-16 ENCOUNTER — TELEPHONE (OUTPATIENT)
Dept: CARDIOLOGY CLINIC | Facility: CLINIC | Age: 66
End: 2021-12-16

## 2021-12-16 ENCOUNTER — OFFICE VISIT (OUTPATIENT)
Dept: CARDIOLOGY CLINIC | Facility: CLINIC | Age: 66
End: 2021-12-16
Payer: COMMERCIAL

## 2021-12-16 ENCOUNTER — DOCUMENTATION (OUTPATIENT)
Dept: CARDIOLOGY CLINIC | Facility: CLINIC | Age: 66
End: 2021-12-16

## 2021-12-16 VITALS
SYSTOLIC BLOOD PRESSURE: 146 MMHG | WEIGHT: 211.4 LBS | OXYGEN SATURATION: 98 % | BODY MASS INDEX: 30.26 KG/M2 | DIASTOLIC BLOOD PRESSURE: 74 MMHG | HEART RATE: 73 BPM | HEIGHT: 70 IN

## 2021-12-16 DIAGNOSIS — E78.2 MIXED HYPERLIPIDEMIA: ICD-10-CM

## 2021-12-16 DIAGNOSIS — Z72.0 TOBACCO ABUSE: ICD-10-CM

## 2021-12-16 DIAGNOSIS — I42.8 NONISCHEMIC CARDIOMYOPATHY (HCC): ICD-10-CM

## 2021-12-16 DIAGNOSIS — Z09 HOSPITAL DISCHARGE FOLLOW-UP: Primary | ICD-10-CM

## 2021-12-16 DIAGNOSIS — I44.7 LBBB (LEFT BUNDLE BRANCH BLOCK): ICD-10-CM

## 2021-12-16 DIAGNOSIS — I25.10 ARTERIOSCLEROSIS OF CORONARY ARTERY: ICD-10-CM

## 2021-12-16 DIAGNOSIS — I50.22 CHRONIC HFREF (HEART FAILURE WITH REDUCED EJECTION FRACTION) (HCC): ICD-10-CM

## 2021-12-16 DIAGNOSIS — I10 BENIGN ESSENTIAL HYPERTENSION: ICD-10-CM

## 2021-12-16 PROBLEM — I50.43 ACUTE ON CHRONIC COMBINED SYSTOLIC AND DIASTOLIC HEART FAILURE (HCC): Status: RESOLVED | Noted: 2021-11-24 | Resolved: 2021-12-16

## 2021-12-16 PROCEDURE — 99214 OFFICE O/P EST MOD 30 MIN: CPT | Performed by: NURSE PRACTITIONER

## 2021-12-16 RX ORDER — ATORVASTATIN CALCIUM 20 MG/1
TABLET, FILM COATED ORAL
COMMUNITY
Start: 2021-12-04

## 2021-12-16 RX ORDER — GABAPENTIN 800 MG/1
TABLET ORAL
COMMUNITY
Start: 2021-11-04

## 2021-12-16 RX ORDER — DICLOFENAC SODIUM 75 MG/1
TABLET, DELAYED RELEASE ORAL
COMMUNITY
Start: 2021-12-10

## 2021-12-16 RX ORDER — BUPROPION HYDROCHLORIDE 150 MG/1
TABLET ORAL
COMMUNITY
Start: 2021-12-03

## 2021-12-16 RX ORDER — TRAMADOL HYDROCHLORIDE 50 MG/1
TABLET ORAL
COMMUNITY
Start: 2021-12-13

## 2022-01-13 ENCOUNTER — TELEPHONE (OUTPATIENT)
Dept: CARDIOLOGY CLINIC | Facility: CLINIC | Age: 67
End: 2022-01-13

## 2022-01-13 NOTE — TELEPHONE ENCOUNTER
[facility]  is asking for CC for surgery with Dr Sandra Roa    Pt to have left knee removal of prosthesis and insertion of temporary antibiotic spacer on 2/10/22 with spinal anesthesia    LOV 12/16/21 with Lupita Dakins and LOV with you was 10/6/21    Last EKG 11/23/21    Pt takes aspirin 81mg daily     Ok to clear for surgery and prep letter?

## 2022-01-27 DIAGNOSIS — I25.5 ISCHEMIC CARDIOMYOPATHY: ICD-10-CM

## 2022-01-27 RX ORDER — CARVEDILOL 6.25 MG/1
TABLET ORAL
Qty: 180 TABLET | Refills: 0 | Status: SHIPPED | OUTPATIENT
Start: 2022-01-27 | End: 2022-05-06

## 2022-05-06 DIAGNOSIS — I25.5 ISCHEMIC CARDIOMYOPATHY: ICD-10-CM

## 2022-05-06 RX ORDER — CARVEDILOL 6.25 MG/1
TABLET ORAL
Qty: 180 TABLET | Refills: 0 | Status: SHIPPED | OUTPATIENT
Start: 2022-05-06

## 2022-05-06 NOTE — TELEPHONE ENCOUNTER
Called pt - declines to make appt today  Will be having surgery for knee and will CB to make appt when he knows date of surgery

## 2022-09-05 NOTE — PROGRESS NOTES
Cardiology Follow Up    [facility]  1955  [id number]  [facility]  [address] [place]  [address]  [phone]  [phone]    1  LBBB (left bundle branch block)     2  Essential (primary) hypertension     3  Tobacco abuse     4  Pure hypercholesterolemia     5  Chronic HFrEF (heart failure with reduced ejection fraction) ([facility])         Interval History:  Patient is here for a follow-up visit and cardiac clearance for knee surgery  He requires revision of a left TKR to be done September 29th at [facility]    He has a LBBB   He is a long-term user of tobacco and alcohol   He continues to smoke and drink alcohol  I did ask him to discontinue this  He does have a history of left leg DVT treated with Coumadin   Cardiac catheterization done October 2016  demonstrated moderately reduced LV systolic function with an LVEFof 40%   Coronary arteries showed no severe obstructive disease   His cardiomyopathy was felt to be related to his alcohol use  Echocardiogram done November 2021 demonstrated a mildly dilated LV with an LVEF of 40%  Mild LVH was noted  There was global hypokinesis  There was mild LAE  There was no significant valvular heart disease  EKG today demonstrates SB with LBBB with no significant change compared to a prior tracing done October 6, 2021  He has had no chest pain or significant dyspnea  His vital signs are stable  He is here today with his wife      Patient Active Problem List   Diagnosis    Nonischemic cardiomyopathy ([facility] [address] )    Closed displaced fracture of proximal phalanx of ring finger    Abnormal stress test    Benign essential hypertension    Arteriosclerosis of coronary artery    Chronic pain syndrome    Diabetes mellitus type 2 with neurological manifestations ([address] )    LBBB (left bundle branch block)    Pyogenic inflammation of bone (HCC)    Pain in joint of right hip    Right knee pain  Skin ulcer of toe of left foot with fat layer exposed ([facility] )    Spinal stenosis    Spondylosis of 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    Left thigh pain    Sacroiliitis (HCC)    Chronic left-sided low back pain without sciatica    Lumbar foraminal stenosis    Lumbar radiculopathy    Tobacco abuse     Past Medical History:   Diagnosis Date    Arthritis     Colon polyps     Current every day smoker     Diabetes mellitus ([facility] )     type II    Drinks beer     DVT (deep venous thrombosis) ([facility] ) 2008    l leg    Full dentures     Hyperlipidemia     Hypertension     Vocal cord polyps      Social History     Socioeconomic History    Marital status: /Civil Union     Spouse name: Not on file    Number of children: Not on file    Years of education: Not on file    Highest education level: Not on file   Occupational History    Not on file   Tobacco Use    Smoking status: Current Every Day Smoker     Packs/day: 1 50     Types: Cigarettes    Smokeless tobacco: Never Used    Tobacco comment: X 40+ yrs     Substance and Sexual Activity    Alcohol use: Yes     Comment: daily ETOH in past, recently has a 6 pk of beer on weekends    Drug use: No    Sexual activity: Yes     Partners: Female   Other Topics Concern    Not on file   Social History Narrative    Not on file     Social Determinants of Health     Financial Resource Strain: Not on file   Food Insecurity: Not on file   Transportation Needs: Not on file   Physical Activity: Not on file   Stress: Not on file   Social Connections: Not on file   Intimate Partner Violence: Not on file   Housing Stability: Not on file      Family History   Problem Relation Age of Onset    Deep vein thrombosis Mother     Multiple sclerosis Mother     Hypertension Father      Past Surgical History:   Procedure Laterality Date    BACK SURGERY      COLONOSCOPY      HERNIA REPAIR Right     inguinal    IVC FILTER INSERTION      JOINT REPLACEMENT l tkr and r thr    LARYNGOSCOPY      LUMBAR SPINE SURGERY      CO AMPUTATION TOE,I-P JT Left 4/25/2017    Procedure: 2ND TOE AMPUTATION;  Surgeon: Carline Mehta DPM;  [facility];  Service: Podiatry    CO OPEN TX PHALANGEAL SHAFT FRACTURE PROX/MIDDLE EA Left 3/28/2017    Procedure: OPEN REDUCTION W/ INTERNAL FIXATION (ORIF)PROXIMAL PHALANX RING FINGER;  Surgeon: Jesika Jang MD;  [facility];  Service: Orthopedics       Current Outpatient Medications:     albuterol (Ventolin HFA) 90 mcg/act inhaler, Inhale 1-2 puffs every 6 (six) hours as needed for wheezing or shortness of breath, Disp: 8 g, Rfl: 0    amitriptyline (ELAVIL) 150 MG tablet, Take 150 mg by mouth daily, Disp: , Rfl:     aspirin 81 MG tablet, Take 81 mg by mouth daily, Disp: , Rfl:     atorvastatin (LIPITOR) 20 mg tablet, Take 20 mg by mouth daily, Disp: , Rfl:     carvedilol (COREG) 6 25 mg tablet, TAKE 1 TABLET BY MOUTH TWICE A DAY WITH MEALS, Disp: 180 tablet, Rfl: 0    Cholecalciferol (VITAMIN D3) 1000 units CAPS, Take by mouth, Disp: , Rfl:     clotrimazole-betamethasone (LOTRISONE) 1-0 05 % cream, Apply topically, Disp: , Rfl:     cyanocobalamin (VITAMIN B-12) 1,000 mcg tablet, Take 1,000 mcg by mouth daily, Disp: , Rfl:     DULoxetine (CYMBALTA) 60 mg delayed release capsule, , Disp: , Rfl:     folic acid (FOLVITE) 1 mg tablet, Take 1 mg by mouth daily, Disp: , Rfl:     furosemide (LASIX) 40 mg tablet, Take 1 tablet (40 mg total) by mouth daily, Disp: 30 tablet, Rfl: 6    gabapentin (NEURONTIN) 800 mg tablet, Take 800 mg by mouth 2 (two) times a day, Disp: , Rfl:     HYDROcodone-acetaminophen (NORCO) 5-325 mg per tablet, Take 1 tablet by mouth every 4 (four) hours as needed, Disp: , Rfl:     levothyroxine 50 mcg tablet, Take 50 mcg by mouth daily, Disp: , Rfl:     Lidocaine-Menthol 4-5 % PTCH, Apply topically, Disp: , Rfl:     lisinopril (ZESTRIL) 40 mg tablet, Take 40 mg by mouth daily, Disp: , Rfl:     metFORMIN (GLUCOPHAGE) 1000 MG tablet, Take 1,000 mg by mouth 2 (two) times a day with meals, Disp: , Rfl:     diclofenac (VOLTAREN) 75 mg EC tablet, , Disp: , Rfl:     traMADol (ULTRAM) 50 mg tablet, , Disp: , Rfl:   Allergies   Allergen Reactions    Penicillins Anaphylaxis and Hives     Other reaction(s): Anaphylaxis  Other reaction(s): Hives    Rocephin [Ceftriaxone] Anaphylaxis    Robaxin [Methocarbamol] Rash       Labs:not applicable  Imaging: No results found  Review of Systems:  Review of Systems   All other systems reviewed and are negative  Physical Exam:  /66 (BP Location: Right arm, Patient Position: Sitting, Cuff Size: Standard)   Pulse 59   Ht 5' 10" (1 778 m)   Wt 97 1 kg (214 lb)   BMI 30 71 kg/m²   Physical Exam  Vitals reviewed  Constitutional:       Appearance: He is well-developed  HENT:      Head: Normocephalic and atraumatic  Eyes:      Conjunctiva/sclera: Conjunctivae normal       Pupils: Pupils are equal, round, and reactive to light  Cardiovascular:      Rate and Rhythm: Normal rate  Heart sounds: Normal heart sounds  Pulmonary:      Effort: Pulmonary effort is normal       Breath sounds: Normal breath sounds  Musculoskeletal:      Cervical back: Normal range of motion and neck supple  Skin:     General: Skin is warm and dry  Neurological:      Mental Status: He is alert and oriented to person, place, and time  Discussion/Summary:I will continue the patient's present medical regimen  The patient appears well compensated  I have asked the patient to call if there is a problem in the interim otherwise I will see the patient in six months time  He is cleared from a cardiac point of view for his orthopedic procedure  I asked him to hold aspirin seven days prior  I asked him to eliminate alcohol and tobacco use prior to his procedure

## 2022-09-14 ENCOUNTER — OFFICE VISIT (OUTPATIENT)
Dept: CARDIOLOGY CLINIC | Facility: CLINIC | Age: 67
End: 2022-09-14
Payer: COMMERCIAL

## 2022-09-14 VITALS
HEART RATE: 59 BPM | HEIGHT: 70 IN | BODY MASS INDEX: 30.64 KG/M2 | WEIGHT: 214 LBS | SYSTOLIC BLOOD PRESSURE: 116 MMHG | DIASTOLIC BLOOD PRESSURE: 66 MMHG

## 2022-09-14 DIAGNOSIS — I50.22 CHRONIC HFREF (HEART FAILURE WITH REDUCED EJECTION FRACTION) (HCC): ICD-10-CM

## 2022-09-14 DIAGNOSIS — I44.7 LBBB (LEFT BUNDLE BRANCH BLOCK): Primary | ICD-10-CM

## 2022-09-14 DIAGNOSIS — E78.00 PURE HYPERCHOLESTEROLEMIA: ICD-10-CM

## 2022-09-14 DIAGNOSIS — Z72.0 TOBACCO ABUSE: ICD-10-CM

## 2022-09-14 DIAGNOSIS — Z01.810 PRE-OPERATIVE CARDIOVASCULAR EXAMINATION: ICD-10-CM

## 2022-09-14 DIAGNOSIS — I10 ESSENTIAL (PRIMARY) HYPERTENSION: ICD-10-CM

## 2022-09-14 PROCEDURE — 99214 OFFICE O/P EST MOD 30 MIN: CPT | Performed by: INTERNAL MEDICINE

## 2022-09-14 RX ORDER — HYDROCODONE BITARTRATE AND ACETAMINOPHEN 5; 325 MG/1; MG/1
1 TABLET ORAL EVERY 4 HOURS PRN
COMMUNITY
Start: 2022-06-08

## 2023-01-02 DIAGNOSIS — I25.5 ISCHEMIC CARDIOMYOPATHY: ICD-10-CM

## 2023-01-03 RX ORDER — CARVEDILOL 6.25 MG/1
TABLET ORAL
Qty: 180 TABLET | Refills: 3 | Status: SHIPPED | OUTPATIENT
Start: 2023-01-03

## 2023-01-09 ENCOUNTER — OFFICE VISIT (OUTPATIENT)
Dept: PODIATRY | Facility: CLINIC | Age: 68
End: 2023-01-09

## 2023-01-09 VITALS
HEIGHT: 70 IN | HEART RATE: 90 BPM | SYSTOLIC BLOOD PRESSURE: 133 MMHG | WEIGHT: 211 LBS | DIASTOLIC BLOOD PRESSURE: 84 MMHG | BODY MASS INDEX: 30.21 KG/M2

## 2023-01-09 DIAGNOSIS — L97.522 DIABETIC ULCER OF TOE OF LEFT FOOT ASSOCIATED WITH TYPE 2 DIABETES MELLITUS, WITH FAT LAYER EXPOSED (HCC): ICD-10-CM

## 2023-01-09 DIAGNOSIS — E11.40 TYPE 2 DIABETES MELLITUS WITH DIABETIC NEUROPATHY, UNSPECIFIED WHETHER LONG TERM INSULIN USE (HCC): ICD-10-CM

## 2023-01-09 DIAGNOSIS — E11.610 DIABETIC CHARCOT FOOT (HCC): ICD-10-CM

## 2023-01-09 DIAGNOSIS — L97.411 DIABETIC ULCER OF RIGHT MIDFOOT ASSOCIATED WITH TYPE 2 DIABETES MELLITUS, LIMITED TO BREAKDOWN OF SKIN (HCC): Primary | ICD-10-CM

## 2023-01-09 DIAGNOSIS — B35.1 ONYCHOMYCOSIS: ICD-10-CM

## 2023-01-09 DIAGNOSIS — E11.621 DIABETIC ULCER OF RIGHT MIDFOOT ASSOCIATED WITH TYPE 2 DIABETES MELLITUS, LIMITED TO BREAKDOWN OF SKIN (HCC): Primary | ICD-10-CM

## 2023-01-09 DIAGNOSIS — E11.621 DIABETIC ULCER OF TOE OF LEFT FOOT ASSOCIATED WITH TYPE 2 DIABETES MELLITUS, WITH FAT LAYER EXPOSED (HCC): ICD-10-CM

## 2023-01-09 RX ORDER — DOXYCYCLINE HYCLATE 100 MG/1
100 CAPSULE ORAL EVERY 12 HOURS SCHEDULED
Qty: 14 CAPSULE | Refills: 0 | Status: SHIPPED | OUTPATIENT
Start: 2023-01-09 | End: 2023-01-16

## 2023-01-09 NOTE — PROGRESS NOTES
Patient ID: Concetta Jennings is a 69 y o  male Date of Birth 1955       Chief Complaint   Patient presents with   • Follow-up     3 months  Spot on bottom of foot         Allergies:  Penicillins, Rocephin [ceftriaxone], and Robaxin [methocarbamol]    Diagnosis:  1  Diabetic ulcer of right midfoot associated with type 2 diabetes mellitus, limited to breakdown of skin ([address] )  -     Diabetic Shoe    2  Diabetic Charcot foot ([address] )  -     Diabetic Shoe    3  Type 2 diabetes mellitus with diabetic neuropathy, unspecified whether long term insulin use ([facility] )    4  Diabetic ulcer of toe of left foot associated with type 2 diabetes mellitus, with fat layer exposed ([address] )  -     Wound culture and Gram stain  -     mupirocin (BACTROBAN) 2 % ointment; Apply topically daily Left third toe  -     doxycycline hyclate (VIBRAMYCIN) 100 mg capsule; Take 1 capsule (100 mg total) by mouth every 12 (twelve) hours for 7 days  -     Debridement    5  Onychomycosis     Diagnosis ICD-10-CM Associated Orders   1  Diabetic ulcer of right midfoot associated with type 2 diabetes mellitus, limited to breakdown of skin ([address] )  E11 621 Diabetic Shoe    L97 411       2  Diabetic Charcot foot ([facility] )  E11 610 Diabetic Shoe      3  Type 2 diabetes mellitus with diabetic neuropathy, unspecified whether long term insulin use ([facility])  E11 40       4  Diabetic ulcer of toe of left foot associated with type 2 diabetes mellitus, with fat layer exposed ([facility] )  E11 621 Wound culture and Gram stain    L97 522 mupirocin (BACTROBAN) 2 % ointment     doxycycline hyclate (VIBRAMYCIN) 100 mg capsule     Debridement      5  Onychomycosis  B35 1          Assessment & Plan:  See wound orders  Wound culture obtained left third toe  Rx doxycycline 100 mg twice daily x1 week  Rx mupirocin ointment to be applied to left third toe daily    Rx Orthotic modification prescription dispensed to patient  Wound care instructions reviewed in detail, mupirocin dressing every other day left third toe  Watch right foot for further breakdown, 1/2 inch felt accommodative padding applied to right orthotic to increase relief for midfoot deformity  Follow-up in 2 weeks and 3 months for diabetic foot care  Call if any signs of infection are noted  Subjective:   69year-old type II diabetic presents for his regular diabetic foot care appointment but is concerned about the discoloration on the bottom of his right foot and a similar concern about the tip of his left third toe  Patient reports he has been doing a lot of walking lately and placed a new pair of orthotics into his custom diabetic shoes  Past medical history of Charcot breakdown of his right foot and history of prior multiple ulcerations and amputation of his left second toe  Denies any fever or shortness of breath        The following portions of the patient's history were reviewed and updated as appropriate:   Patient Active Problem List   Diagnosis   • Nonischemic cardiomyopathy ([facility] [address] )   • Closed displaced fracture of proximal phalanx of ring finger   • Abnormal stress test   • Benign essential hypertension   • Arteriosclerosis of coronary artery   • Chronic pain syndrome   • Diabetes mellitus type 2 with neurological manifestations (HCC)   • LBBB (left bundle branch block)   • Pyogenic inflammation of bone ([facility])   • Pain in joint of right hip   • Right knee pain   • Skin ulcer of toe of left foot with fat layer exposed ([facility] )   • Spinal stenosis   • Spondylosis of 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   • Left thigh pain   • Sacroiliitis (HCC)   • Chronic left-sided low back pain without sciatica   • Lumbar foraminal stenosis   • Lumbar radiculopathy   • Tobacco abuse     Past Medical History:   Diagnosis Date   • Arthritis    • Colon polyps    • Current every day smoker    • Diabetes mellitus ([facility] )     type II   • Drinks beer    • DVT (deep venous thrombosis) ([facility] ) 2008    l leg   • Full dentures    • Hyperlipidemia • Hypertension    • Vocal cord polyps      Past Surgical History:   Procedure Laterality Date   • BACK SURGERY     • COLONOSCOPY     • HERNIA REPAIR Right     inguinal   • IVC FILTER INSERTION     • JOINT REPLACEMENT      l tkr and r thr   • LARYNGOSCOPY     • LUMBAR SPINE SURGERY     • RI AMPUTATION TOE INTERPHALANGEAL JOINT Left 4/25/2017    Procedure: 2ND TOE AMPUTATION;  Surgeon: Tatianna Chang DPM;  Location:  MAIN OR;  Service: Podiatry   • RI OPEN TX PHALANGEAL SHAFT FRACTURE PROX/MIDDLE EA Left 3/28/2017    Procedure: OPEN REDUCTION W/ INTERNAL FIXATION (ORIF)PROXIMAL PHALANX RING FINGER;  Surgeon: Leopold Mitts, MD;  Location:  MAIN OR;  Service: Orthopedics     Social History     Socioeconomic History   • Marital status: /Civil Union     Spouse name: None   • Number of children: None   • Years of education: None   • Highest education level: None   Occupational History   • None   Tobacco Use   • Smoking status: Every Day     Packs/day: 1 50     Types: Cigarettes   • Smokeless tobacco: Never   • Tobacco comments:     X 40+ yrs     Substance and Sexual Activity   • Alcohol use: Yes     Comment: daily ETOH in past, recently has a 6 pk of beer on weekends   • Drug use: No   • Sexual activity: Yes     Partners: Female   Other Topics Concern   • None   Social History Narrative   • None     Social Determinants of Health     Financial Resource Strain: Not on file   Food Insecurity: Not on file   Transportation Needs: Not on file   Physical Activity: Not on file   Stress: Not on file   Social Connections: Not on file   Intimate Partner Violence: Not on file   Housing Stability: Not on file        Current Outpatient Medications:   •  albuterol (Ventolin HFA) 90 mcg/act inhaler, Inhale 1-2 puffs every 6 (six) hours as needed for wheezing or shortness of breath, Disp: 8 g, Rfl: 0  •  amitriptyline (ELAVIL) 150 MG tablet, Take 150 mg by mouth daily, Disp: , Rfl:   •  aspirin 81 MG tablet, Take 81 mg by mouth daily, Disp: , Rfl:   •  atorvastatin (LIPITOR) 20 mg tablet, Take 20 mg by mouth daily, Disp: , Rfl:   •  carvedilol (COREG) 6 25 mg tablet, TAKE 1 TABLET BY MOUTH TWICE A DAY WITH MEALS, Disp: 180 tablet, Rfl: 3  •  Cholecalciferol (VITAMIN D3) 1000 units CAPS, Take by mouth, Disp: , Rfl:   •  clotrimazole-betamethasone (LOTRISONE) 1-0 05 % cream, Apply topically, Disp: , Rfl:   •  cyanocobalamin (VITAMIN B-12) 1,000 mcg tablet, Take 1,000 mcg by mouth daily, Disp: , Rfl:   •  diclofenac (VOLTAREN) 75 mg EC tablet, , Disp: , Rfl:   •  doxycycline hyclate (VIBRAMYCIN) 100 mg capsule, Take 1 capsule (100 mg total) by mouth every 12 (twelve) hours for 7 days, Disp: 14 capsule, Rfl: 0  •  DULoxetine (CYMBALTA) 60 mg delayed release capsule, , Disp: , Rfl:   •  folic acid (FOLVITE) 1 mg tablet, Take 1 mg by mouth daily, Disp: , Rfl:   •  furosemide (LASIX) 40 mg tablet, Take 1 tablet (40 mg total) by mouth daily, Disp: 30 tablet, Rfl: 6  •  gabapentin (NEURONTIN) 800 mg tablet, Take 800 mg by mouth 2 (two) times a day, Disp: , Rfl:   •  HYDROcodone-acetaminophen (NORCO) 5-325 mg per tablet, Take 1 tablet by mouth every 4 (four) hours as needed, Disp: , Rfl:   •  levothyroxine 50 mcg tablet, Take 50 mcg by mouth daily, Disp: , Rfl:   •  Lidocaine-Menthol 4-5 % PTCH, Apply topically, Disp: , Rfl:   •  lisinopril (ZESTRIL) 40 mg tablet, Take 40 mg by mouth daily, Disp: , Rfl:   •  metFORMIN (GLUCOPHAGE) 1000 MG tablet, Take 1,000 mg by mouth 2 (two) times a day with meals, Disp: , Rfl:   •  mupirocin (BACTROBAN) 2 % ointment, Apply topically daily Left third toe, Disp: 22 g, Rfl: 1  •  traMADol (ULTRAM) 50 mg tablet, , Disp: , Rfl:   Family History   Problem Relation Age of Onset   • Deep vein thrombosis Mother    • Multiple sclerosis Mother    • Hypertension Father       Review of Systems   Constitutional: Negative for chills and fever  HENT: Negative for ear pain and sore throat      Eyes: Negative for pain and visual disturbance  Respiratory: Negative for cough and shortness of breath  Cardiovascular: Negative for chest pain and palpitations  Gastrointestinal: Negative for abdominal pain and vomiting  Genitourinary: Negative for dysuria and hematuria  Musculoskeletal: Positive for gait problem  Negative for arthralgias and back pain  Skin: Positive for wound (Wound right plantar midfoot and tip of left third toe)  Negative for color change and rash  Neurological: Positive for numbness  Negative for seizures and syncope  All other systems reviewed and are negative  Objective:  /84   Pulse 90   Ht 5' 10" (1 778 m) Comment: verbal  Wt 95 7 kg (211 lb)   BMI 30 28 kg/m²     Physical Exam  Constitutional:       Appearance: Normal appearance  HENT:      Head: Normocephalic  Right Ear: Tympanic membrane normal       Left Ear: Tympanic membrane normal       Nose: No congestion  Mouth/Throat:      Mouth: Mucous membranes are moist       Pharynx: No oropharyngeal exudate or posterior oropharyngeal erythema  Eyes:      Extraocular Movements: Extraocular movements intact  Conjunctiva/sclera: Conjunctivae normal       Pupils: Pupils are equal, round, and reactive to light  Cardiovascular:      Rate and Rhythm: Normal rate and regular rhythm  Pulses:           Dorsalis pedis pulses are 1+ on the right side and 1+ on the left side  Posterior tibial pulses are 0 on the right side  Pulmonary:      Effort: Pulmonary effort is normal    Abdominal:      Palpations: Abdomen is soft  Musculoskeletal:         General: Deformity (Midfoot collapse with Charcot breakdown and subsequent prominent cuboid plantar lateral midfoot ) present  Right foot: Deformity (Charcot) and Charcot foot present  Left Lower Extremity: Left leg is amputated below ankle  (Left second toe)  Feet:      Right foot:      Protective Sensation: 10 sites tested  2 sites sensed        Skin integrity: Ulcer (Plantar lateral midfoot: Stable 2 0 x 1 5 x 0 1 cm dark brown eschar, no fluctuance, no drainage, no erythema, no evidence of infection ) present  Toenail Condition: Right toenails are abnormally thick  Fungal disease present  Left foot:      Protective Sensation: 10 sites tested  2 sites sensed  Skin integrity: Ulcer (Left third toe: Full-thickness subcutaneous depth breakdown with hyperkeratotic margin, 1 drop of purulence, measures 0 4 x 0 5 x 0 2 cm, no tiarra cellulitis or lymphangitis noted ) present  Toenail Condition: Left toenails are abnormally thick  Fungal disease present  Skin:     General: Skin is warm and dry  Capillary Refill: Capillary refill takes 2 to 3 seconds  Coloration: Skin is not pale  Findings: No bruising, erythema, lesion or rash  Comments:   Onychopathology: Severely dystrophic nails x9 (x5 right, x4 left), 0 5 cm average thickness, yellow discoloration, brittle nature, fungal odor, and moderate subungual debris noted  Neurological:      Mental Status: He is alert and oriented to person, place, and time  Cranial Nerves: No cranial nerve deficit  Sensory: Sensory deficit present  Motor: No weakness  Gait: Gait normal       Deep Tendon Reflexes: Reflexes normal    Psychiatric:         Mood and Affect: Mood normal          Behavior: Behavior normal          Judgment: Judgment normal           Nail & Lesion Procedures: All mycotic toenails(x9) were reduced and debrided in length, width, and girth using a nail nipper and dremel([id number])  All non-dystrphic nails also trimmed  All hyperkeratotic skin lesion(s) were sharply pared with a scalpel / forcep with no bleeding or evidence of ulceration  Patient tolerated procedure(s) well without complications  Debridement   Universal Protocol:  Consent: Verbal consent obtained  Written consent obtained    Risks and benefits: risks, benefits and alternatives were discussed  Consent given by: patient  Time out: Immediately prior to procedure a "time out" was called to verify the correct patient, procedure, equipment, support staff and site/side marked as required  Patient understanding: patient states understanding of the procedure being performed  Patient identity confirmed: verbally with patient      Performed by: physician  Debridement type: surgical  Level of debridement: subcutaneous tissue  Pain control: lidocaine 4%  Post-debridement measurements  Length (cm): 0 4  Width (cm): 0 5  Depth (cm): 0 2  Percent debrided: 100%  Surface Area (cm^2): 0 2  Area debrided (cm^2): 0 2  Volume (cm^3): 0 04  Tissue and other material debrided: subcutaneous tissue  Devitalized tissue debrided: callus and necrotic debris  Instrument(s) utilized: nippers and blade  Bleeding: medium  Hemostasis obtained with: pressure  Procedural pain (0-10): insensate  Post-procedural pain: insensate   Response to treatment: procedure was tolerated well                   Wound Instructions:  Orders Placed This Encounter   Procedures   • Debridement     This order was created via procedure documentation   • Diabetic Shoe     Please modify patient's right foot custom orthotic, the pocket/accommodation for the midfoot ulceration is not deep enough  Current Plastizote orthotic is too thin  Please see felt applied to current orthotic for guidance and marking on the device     Order Specific Question:   Type     Answer:   Custom Molded   • Wound culture and Gram stain     Left third toe ulceration     Order Specific Question:   Release to patient through Mychart     Answer:   Immediate         Tamea Briones, DPM      Portions of the record may have been created with voice recognition software  Occasional wrong word or "sound a like" substitutions may have occurred due to the inherent limitations of voice recognition software   Read the chart carefully and recognize, using context, where substitutions have occurred

## 2023-01-11 ENCOUNTER — LAB REQUISITION (OUTPATIENT)
Dept: LAB | Facility: HOSPITAL | Age: 68
End: 2023-01-11

## 2023-01-11 DIAGNOSIS — E11.621 TYPE 2 DIABETES MELLITUS WITH FOOT ULCER (CODE) (HCC): ICD-10-CM

## 2023-01-11 DIAGNOSIS — L97.522 NON-PRESSURE CHRONIC ULCER OF OTHER PART OF LEFT FOOT WITH FAT LAYER EXPOSED (HCC): ICD-10-CM

## 2023-01-13 LAB
BACTERIA WND AEROBE CULT: NORMAL
GRAM STN SPEC: NORMAL

## 2023-01-23 ENCOUNTER — OFFICE VISIT (OUTPATIENT)
Dept: PODIATRY | Facility: CLINIC | Age: 68
End: 2023-01-23

## 2023-01-23 VITALS
SYSTOLIC BLOOD PRESSURE: 131 MMHG | DIASTOLIC BLOOD PRESSURE: 76 MMHG | HEIGHT: 70 IN | WEIGHT: 211 LBS | HEART RATE: 103 BPM | BODY MASS INDEX: 30.21 KG/M2

## 2023-01-23 DIAGNOSIS — L97.411 DIABETIC ULCER OF RIGHT MIDFOOT ASSOCIATED WITH TYPE 2 DIABETES MELLITUS, LIMITED TO BREAKDOWN OF SKIN (HCC): ICD-10-CM

## 2023-01-23 DIAGNOSIS — L97.521 DIABETIC ULCER OF TOE OF LEFT FOOT ASSOCIATED WITH TYPE 2 DIABETES MELLITUS, LIMITED TO BREAKDOWN OF SKIN (HCC): Primary | ICD-10-CM

## 2023-01-23 DIAGNOSIS — E11.610 DIABETIC CHARCOT FOOT (HCC): ICD-10-CM

## 2023-01-23 DIAGNOSIS — E11.40 TYPE 2 DIABETES MELLITUS WITH DIABETIC NEUROPATHY, UNSPECIFIED WHETHER LONG TERM INSULIN USE (HCC): ICD-10-CM

## 2023-01-23 DIAGNOSIS — E11.621 DIABETIC ULCER OF RIGHT MIDFOOT ASSOCIATED WITH TYPE 2 DIABETES MELLITUS, LIMITED TO BREAKDOWN OF SKIN (HCC): ICD-10-CM

## 2023-01-23 DIAGNOSIS — E11.621 DIABETIC ULCER OF TOE OF LEFT FOOT ASSOCIATED WITH TYPE 2 DIABETES MELLITUS, LIMITED TO BREAKDOWN OF SKIN (HCC): Primary | ICD-10-CM

## 2023-01-24 NOTE — PROGRESS NOTES
Assessment/Plan:    Diabetic ulcer of toe of left foot associated with type 2 diabetes mellitus, limited to breakdown of skin ([address] )  Wound debrided, details below  Continue with mupirocin/alginate dressing every other day  Diabetic ulcer of right midfoot associated with type 2 diabetes mellitus, limited to breakdown of skin ([address] )  Continue to protect area wearing custom diabetic shoe at all times  Allevyn foam dressing every 2 or 3 days to additionally pad the plantar prominence secondary to Charcot breakdown  Follow-up as scheduled on February 2 as scheduled with [facility] for new diabetic orthotics/shoes    Diabetic Charcot foot ([address] )  New diabetic custom orthotics/shoes required due to gradual change in deformity with extreme prominence of osseous plantar lateral cuboid    Type 2 diabetes mellitus with diabetic neuropathy, unspecified whether long term insulin use ([facility] )    Other orders  -     Debridement        Debridement   Wound 01/09/23 Diabetic Ulcer Toe (Comment  which one) Left;Distal    Universal Protocol:  Consent: Verbal consent obtained  Risks and benefits: risks, benefits and alternatives were discussed  Consent given by: patient  Time out: Immediately prior to procedure a "time out" was called to verify the correct patient, procedure, equipment, support staff and site/side marked as required    Patient understanding: patient states understanding of the procedure being performed  Patient identity confirmed: verbally with patient      Performed by: physician  Debridement type: selective  Pain control: lidocaine 4%  Post-debridement measurements  Length (cm): 0 3  Width (cm): 0 2  Depth (cm): 0 1  Percent debrided: 100%  Surface Area (cm^2): 0 06  Area debrided (cm^2): 0 06  Volume (cm^3): 0 01  Devitalized tissue debrided: callus and slough  Instrument(s) utilized: blade  Bleeding: small  Hemostasis obtained with: pressure  Procedural pain (0-10): 2  Post-procedural pain: 2   Response to treatment: procedure was tolerated well          Subjective:      Patient ID: Sho Abraham is a 69 y o  male  1/24/2023: 78-year-old type II diabetic seen today for follow-up treatment of chronic ulcer distal tip of his left third toe and plantar aspect of his right Charcot foot  Denies any new pain/discomfort  Reports good progress with both wounds  39/2023: 78-year-old type II diabetic presents for his regular diabetic foot care appointment but is concerned about the discoloration on the bottom of his right foot and a similar concern about the tip of his left third toe  Patient reports he has been doing a lot of walking lately and placed a new pair of orthotics into his custom diabetic shoes  Past medical history of Charcot breakdown of his right foot and history of prior multiple ulcerations and amputation of his left second toe  Denies any fever or shortness of breath  The following portions of the patient's history were reviewed and updated as appropriate: allergies, current medications, past family history, past medical history, past social history, past surgical history and problem list     Review of Systems   Constitutional: Negative for chills and fever  HENT: Negative for ear pain and sore throat  Eyes: Negative for pain and visual disturbance  Respiratory: Negative for cough and shortness of breath  Cardiovascular: Negative for chest pain and palpitations  Gastrointestinal: Negative for abdominal pain and vomiting  Genitourinary: Negative for dysuria and hematuria  Musculoskeletal: Positive for gait problem  Negative for arthralgias and back pain  Skin: Positive for wound (Follow-up right plantar midfoot and tip of left third toe ulceration)  Negative for color change and rash  Neurological: Positive for numbness  Negative for seizures and syncope  All other systems reviewed and are negative          Objective:      /76   Pulse 103   Ht 5' 10" (1 778 m) Wt 95 7 kg (211 lb)   BMI 30 28 kg/m²          Physical Exam  Constitutional:       Appearance: Normal appearance  HENT:      Head: Normocephalic  Right Ear: Tympanic membrane normal       Left Ear: Tympanic membrane normal       Nose: No congestion  Mouth/Throat:      Mouth: Mucous membranes are moist       Pharynx: No oropharyngeal exudate or posterior oropharyngeal erythema  Eyes:      Extraocular Movements: Extraocular movements intact  Conjunctiva/sclera: Conjunctivae normal       Pupils: Pupils are equal, round, and reactive to light  Cardiovascular:      Rate and Rhythm: Normal rate and regular rhythm  Pulses:           Dorsalis pedis pulses are 1+ on the right side and 1+ on the left side  Posterior tibial pulses are 0 on the right side  Pulmonary:      Effort: Pulmonary effort is normal    Abdominal:      Palpations: Abdomen is soft  Musculoskeletal:         General: Deformity (Midfoot collapse with Charcot breakdown and subsequent prominent cuboid plantar lateral midfoot ) present  Right foot: Deformity (Charcot) and Charcot foot present  Left foot: Deformity (Contracted left third toe, 70 reducible deformity) present  Left Lower Extremity: Left leg is amputated below ankle  (Left second toe)  Feet:      Right foot:      Protective Sensation: 10 sites tested  2 sites sensed  Skin integrity: Ulcer (Plantar lateral midfoot: Stable eschar, no drainage, no erythema, no evidence of infection ) present  Toenail Condition: Right toenails are abnormally thick  Fungal disease present  Left foot:      Protective Sensation: 10 sites tested  2 sites sensed  Skin integrity: Ulcer (Left third toe: Partial depth breakdown with hyperkeratotic margin, scant serosanguineous drainage, measures 0 3 x 0 2 x 0 1 cm, no infection ) present  Toenail Condition: Left toenails are abnormally thick  Fungal disease present    Skin:     General: Skin is warm and dry  Capillary Refill: Capillary refill takes 2 to 3 seconds  Coloration: Skin is not pale  Findings: No bruising, erythema, lesion or rash  Neurological:      Mental Status: He is alert and oriented to person, place, and time  Cranial Nerves: No cranial nerve deficit  Sensory: Sensory deficit present  Motor: No weakness        Gait: Gait normal       Deep Tendon Reflexes: Reflexes normal    Psychiatric:         Mood and Affect: Mood normal          Behavior: Behavior normal          Judgment: Judgment normal

## 2023-02-14 ENCOUNTER — OFFICE VISIT (OUTPATIENT)
Dept: PODIATRY | Facility: CLINIC | Age: 68
End: 2023-02-14

## 2023-02-14 VITALS
HEIGHT: 70 IN | DIASTOLIC BLOOD PRESSURE: 55 MMHG | HEART RATE: 68 BPM | BODY MASS INDEX: 30.21 KG/M2 | SYSTOLIC BLOOD PRESSURE: 94 MMHG | WEIGHT: 211 LBS

## 2023-02-14 DIAGNOSIS — L97.521 DIABETIC ULCER OF TOE OF LEFT FOOT ASSOCIATED WITH TYPE 2 DIABETES MELLITUS, LIMITED TO BREAKDOWN OF SKIN (HCC): Primary | ICD-10-CM

## 2023-02-14 DIAGNOSIS — E11.621 DIABETIC ULCER OF TOE OF LEFT FOOT ASSOCIATED WITH TYPE 2 DIABETES MELLITUS, LIMITED TO BREAKDOWN OF SKIN (HCC): Primary | ICD-10-CM

## 2023-02-14 DIAGNOSIS — L97.411 DIABETIC ULCER OF RIGHT MIDFOOT ASSOCIATED WITH TYPE 2 DIABETES MELLITUS, LIMITED TO BREAKDOWN OF SKIN (HCC): ICD-10-CM

## 2023-02-14 DIAGNOSIS — E11.40 TYPE 2 DIABETES MELLITUS WITH DIABETIC NEUROPATHY, UNSPECIFIED WHETHER LONG TERM INSULIN USE (HCC): ICD-10-CM

## 2023-02-14 DIAGNOSIS — E11.621 DIABETIC ULCER OF RIGHT MIDFOOT ASSOCIATED WITH TYPE 2 DIABETES MELLITUS, LIMITED TO BREAKDOWN OF SKIN (HCC): ICD-10-CM

## 2023-02-14 DIAGNOSIS — E11.610 DIABETIC CHARCOT FOOT (HCC): ICD-10-CM

## 2023-02-14 NOTE — PROGRESS NOTES
Assessment:    Diabetic ulcer of toe of left foot associated with type 2 diabetes mellitus, limited to breakdown of skin ([address] )    Diabetic ulcer of right midfoot associated with type 2 diabetes mellitus, limited to breakdown of skin ([address] )    Diabetic Charcot foot ([address] )    Type 2 diabetes mellitus with diabetic neuropathy, unspecified whether long term insulin use ([address] )      Plan:  Follow-up as scheduled with the orthotist tomorrow  Watch for signs of recurrent breakdown  Follow-up as scheduled for diabetic foot care mid March 2023  Moisturize feet regularly and watch closely especially the right foot for recurrent breakdown once new diabetic shoes are obtained  Patient is high risk for real ulcerating due to advanced neuropathy and structural deformity noted  Subjective:      Patient ID: Quintin Alvares is a 69 y o  male  2/14/2023: 69year-old type II diabetic seen today for follow-up evaluation of DFU left third toe and right plantar midfoot  Reports good progress with no drainage denies any fever or shortness of breath     1/24/2023: 69year-old type II diabetic seen today for follow-up treatment of chronic ulcer distal tip of his left third toe and plantar aspect of his right Charcot foot  Denies any new pain/discomfort  Reports good progress with both wounds  39/2023: 38-year-old type II diabetic presents for his regular diabetic foot care appointment but is concerned about the discoloration on the bottom of his right foot and a similar concern about the tip of his left third toe  Patient reports he has been doing a lot of walking lately and placed a new pair of orthotics into his custom diabetic shoes  Past medical history of Charcot breakdown of his right foot and history of prior multiple ulcerations and amputation of his left second toe  Denies any fever or shortness of breath        The following portions of the patient's history were reviewed and updated as appropriate: allergies, current medications, past family history, past medical history, past social history, past surgical history and problem list     Review of Systems   Constitutional: Negative for chills and fever  HENT: Negative for ear pain and sore throat  Eyes: Negative for pain and visual disturbance  Respiratory: Negative for cough and shortness of breath  Cardiovascular: Negative for chest pain and palpitations  Gastrointestinal: Negative for abdominal pain and vomiting  Genitourinary: Negative for dysuria and hematuria  Musculoskeletal: Negative for arthralgias and back pain  Charcot deformity right foot   Skin: Positive for wound (Plantar right midfoot and distal tip left third toe )  Negative for color change and rash  Neurological: Positive for numbness  Negative for seizures and syncope  All other systems reviewed and are negative  Objective:      BP 94/55 (BP Location: Left arm, Patient Position: Sitting, Cuff Size: Adult)   Pulse 68   Ht 5' 10" (1 778 m) Comment: verbal  Wt 95 7 kg (211 lb)   BMI 30 28 kg/m²          Physical Exam  Constitutional:       Appearance: Normal appearance  HENT:      Head: Normocephalic  Right Ear: Tympanic membrane normal       Left Ear: Tympanic membrane normal       Nose: No congestion  Mouth/Throat:      Mouth: Mucous membranes are moist       Pharynx: No oropharyngeal exudate or posterior oropharyngeal erythema  Eyes:      Extraocular Movements: Extraocular movements intact  Conjunctiva/sclera: Conjunctivae normal       Pupils: Pupils are equal, round, and reactive to light  Cardiovascular:      Rate and Rhythm: Normal rate and regular rhythm  Pulses:           Dorsalis pedis pulses are 1+ on the right side and 1+ on the left side  Posterior tibial pulses are 0 on the right side and 0 on the left side  Pulmonary:      Effort: Pulmonary effort is normal    Abdominal:      Palpations: Abdomen is soft     Musculoskeletal: General: Deformity (Right Charcot foot deformity with prominent plantar lateral cuboid ) present  Right foot: Charcot foot present  Feet:      Right foot:      Protective Sensation: 10 sites tested  2 sites sensed  Skin integrity: Ulcer (Right lateral midfoot closed 100%  ) present  Left foot:      Protective Sensation: 10 sites tested  2 sites sensed  Skin integrity: Ulcer (Distal tip left third toe closed on her percent ) present  Skin:     General: Skin is warm and dry  Capillary Refill: Capillary refill takes 2 to 3 seconds  Coloration: Skin is not pale  Findings: No bruising, erythema, lesion or rash  Neurological:      Mental Status: He is alert  Cranial Nerves: No cranial nerve deficit  Sensory: Sensory deficit (No vibratory or monofilament appreciated with loss of protective sensation ) present  Motor: No weakness        Gait: Gait normal       Deep Tendon Reflexes: Reflexes normal    Psychiatric:         Mood and Affect: Mood normal          Behavior: Behavior normal          Judgment: Judgment normal

## 2023-04-10 ENCOUNTER — OFFICE VISIT (OUTPATIENT)
Dept: PODIATRY | Facility: CLINIC | Age: 68
End: 2023-04-10

## 2023-04-10 VITALS
BODY MASS INDEX: 30.21 KG/M2 | DIASTOLIC BLOOD PRESSURE: 91 MMHG | WEIGHT: 211 LBS | HEIGHT: 70 IN | HEART RATE: 84 BPM | SYSTOLIC BLOOD PRESSURE: 157 MMHG

## 2023-04-10 DIAGNOSIS — B35.1 ONYCHOMYCOSIS: Primary | ICD-10-CM

## 2023-04-10 DIAGNOSIS — L84 CORNS AND CALLUS: ICD-10-CM

## 2023-04-10 DIAGNOSIS — E11.40 TYPE 2 DIABETES MELLITUS WITH DIABETIC NEUROPATHY, WITHOUT LONG-TERM CURRENT USE OF INSULIN (HCC): ICD-10-CM

## 2023-04-23 NOTE — PROGRESS NOTES
"   PATIENT:  Nehal Ortiz  1955    ASSESSMENT:  1  Onychomycosis        2  Corns and callus        3  Type 2 diabetes mellitus with diabetic neuropathy, without long-term current use of insulin (HCC)              No orders of the defined types were placed in this encounter  PLAN:  Disease prevention and related risk factors of diabetes were identified and discussed  The patient was educated in proper foot wear for diabetics  Educated in daily foot assessment and routine diabetic foot care  Discussed the importance of controlling BS through diet and exercise  The patient will follow up in 9-12 weeks for further diabetic foot exam and care  Procedures: 62956, 67077  All mycotic toenails were reduced and debrided in length, width, and girth using a nail nipper and electric dremel  All hyperkeratotic skin lesion(s) if present were sharply pared with a #10 scalpel with no evidence of ulceration/abscess  Patient tolerated procedure(s) well without complications  Procedures     HPI:  Nehal Ortiz is a 69 y  o year old male seen for diabetic foot exam   The patient has class findings with diabetes and chronic Charcot breakdown involving the right foot  BS is under control  The patient complained of thick toenails painful lesions  The patient denied any acute pedal disorder, redness, acute swelling, or recent injury        The following portions of the patient's history were reviewed and updated as appropriate: allergies, current medications, past family history, past medical history, past social history, past surgical history and problem list     REVIEW OF SYSTEMS:  GENERAL: No fever or chills  HEART: No chest pain, or palpitation  RESPIRATORY:  No acute SOB or cough  GI: No Nausea, vomit or diarrhea  NEUROLOGIC: No syncope or acute weakness    PHYSICAL EXAM:    /91   Pulse 84   Ht 5' 10\" (1 778 m)   Wt 95 7 kg (211 lb)   BMI 30 28 kg/m²     VASCULAR " EXAM  Posterior tibial artery absent bilateral  Dorsalis pedis artery +1 bilateral  The patient has class findings with skin atrophy, lack of digital hair, and nail dystrophy  There is no lower extremity edema bilaterally  NEUROLOGIC EXAM  Sensation is intact to light touch  No   Sensation is absent to 10gm monofilament  Vibratory sensation absent  Tingling/numb paresthesias noted bilateral          DERMATOLOGIC EXAM:   Texture, Tone and Turgor are diminished bilateral   The patient has dystrophic/hypertrophic toenails with yellow/white discoloration, onycholysis, and subungal debris  Fungal odor noted  Brittle nature noted  Right foot nails dystrophic x5 with 0 40 cm ave thickness (1 through 5)  Left foot nails dystrophic x4 with 0 45 cm ave thickness (1, 345)  Patient has hyperkeratotic lesions noted:  Right foot located at none  Left foot located at distal tip third toe, and left hallux IPJ plantar medial  No notable suspicious skin lesions  MUSCULOSKELETAL EXAM:   No acute joint pain, edema, or redness  No acute musculoskeletal problem  Patient has Charcot deformities right midfoot  Patient has mild ambulation limitations  Patient wears DM shoes? Yes    Risk Category/Class Findings:  Q 8 (B1, B2 ABC)  1 = Loss of protective sensation    A1)  Has the patient had a previous amputation of the foot or integral skeletal portion thereof? Yes (left #2 toe amputation)  B1)  Does the patient have absent posterior tibial pulse? Yes  B3)  Does the patient have absent dorsalis pedis? No  B2)  Does the patient have three of the following? Yes           1  Hair growth (increased or decreased)  C)  Does the patient have two of the following and one above? Yes            3   Edema, 4  Paraesthesias (abnormal spontaneous sensations in the feet) and 5    Burning

## 2023-10-11 ENCOUNTER — OFFICE VISIT (OUTPATIENT)
Dept: CARDIOLOGY CLINIC | Facility: CLINIC | Age: 68
End: 2023-10-11
Payer: COMMERCIAL

## 2023-10-11 VITALS
HEART RATE: 68 BPM | BODY MASS INDEX: 31.52 KG/M2 | DIASTOLIC BLOOD PRESSURE: 80 MMHG | WEIGHT: 220.2 LBS | HEIGHT: 70 IN | SYSTOLIC BLOOD PRESSURE: 130 MMHG

## 2023-10-11 DIAGNOSIS — I10 ESSENTIAL (PRIMARY) HYPERTENSION: ICD-10-CM

## 2023-10-11 DIAGNOSIS — I50.22 CHRONIC HFREF (HEART FAILURE WITH REDUCED EJECTION FRACTION) (HCC): Primary | ICD-10-CM

## 2023-10-11 DIAGNOSIS — Z72.0 TOBACCO ABUSE: ICD-10-CM

## 2023-10-11 DIAGNOSIS — E78.2 MIXED HYPERLIPIDEMIA: ICD-10-CM

## 2023-10-11 DIAGNOSIS — I42.8 NONISCHEMIC CARDIOMYOPATHY (HCC): Primary | ICD-10-CM

## 2023-10-11 DIAGNOSIS — I25.10 ARTERIOSCLEROSIS OF CORONARY ARTERY: ICD-10-CM

## 2023-10-11 DIAGNOSIS — I42.8 NONISCHEMIC CARDIOMYOPATHY (HCC): ICD-10-CM

## 2023-10-11 PROCEDURE — 99214 OFFICE O/P EST MOD 30 MIN: CPT | Performed by: PHYSICIAN ASSISTANT

## 2023-10-11 NOTE — PROGRESS NOTES
Cardiology Office Follow Up  Chidi Ford  1955  [id number]      ASSESSMENT:  Chronic HFmrEF, NICM (Etoh related), EF 40%  Nonobstructive CAD  Hypertension   Hyperlipidemia   LBBB  DM type 2  OA s/p L TKR with revision  Chronic lower back pain  Hx of Tobacco/Etoh abuse      Prior cardiac work-up  TTE 11/24/2021: EF 40%, G1 DD, moderate global hypokinesis, mild LA dilation, mild MR/TR. Limited TTE 11/4/2020: EF 35 to 40%, mild diffuse hypokinesis with distinct regional wall motion abnormalities with severe hypokinesis of mid anteroseptal, entire inferior, apical septal and apical walls. G1 DD, mild to moderate MR, mild TR. TTE 7/9/2020: 30%, no regional motion abnormalities, severe diffuse hypokinesis with regional variations, G1 DD, LA dilation, mild to moderate MR, trace AR, mild TR. LHC 10/3/2016: LV gram 40%, 10% ostial left main stenosis, 25% mid RCA stenosis. Pharmacologic MPI 9/7/2016: Abnormal study. Small infarct in apical region. Moderate amount of ischemia in the septal region. LBBB artifact present. LVEF reduced with regional wall motion abnormalities, calculated EF 29%. TTE 9/7/2016: EF 35 to 40%, severe hypokinesis of apical anterior, basal mid anteroseptal, apical walls. G1DD. PLAN:  Continue with GDMT: Currently on ASA 81mg, Lipitor 20mg QD, Coreg 6.25mg BID, lisinopril 40mg QD  Salt/fluid restrictions advised  Smoking cessation/EtOH counseling given  On Lasix 40mg QD. Instructed to start using home scale and take extra lasix with >3lbs above baseline weight. RTO in 6 months or sooner if needed -- wishes to establish with a new cardiologist locally but is still in the process of searching. Instructed to cancel follow-up appointment if she is able to establish. Interval History/ HPI:   Joshua Villalobos. 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 Patient known to Dr Farheen Samayoa. Overall feels well. Has LE edema which has been worse lately.  No dyspnea at rest, orthopnea, PND. Not very active, limited due to charcot foot and OA with TKR. He and his wife have been living in a trailer since Aug 2022 while his home is being built. Should be complete by next month. Doesn't have a full kitchen so results to more ready-made and fast food high in salt and sugar. Quit cigarettes over a year ago. Vapes daily. Cutting back on Etoh use, 1-2 drinks 2-3x per month. Inoffice EKG: SR, LBBB, PACs with aberrant conduction, HR 68bpm    Vitals:  130/80  68  220lbs    Past Medical History:   Diagnosis Date    Arthritis     Colon polyps     Current every day smoker     Diabetes mellitus ([address] )     type II    Drinks beer     DVT (deep venous thrombosis) ([address])     l leg    Full dentures     Hyperlipidemia     Hypertension     Vocal cord polyps      Social History     Socioeconomic History    Marital status: /Civil Union     Spouse name: Not on file    Number of children: Not on file    Years of education: Not on file    Highest education level: Not on file   Occupational History    Not on file   Tobacco Use    Smoking status: Former     Packs/day: 1.50     Types: Cigarettes     Quit date:      Years since quittin.7    Smokeless tobacco: Never    Tobacco comments:     X 40+ yrs.    Vaping Use    Vaping Use: Every day   Substance and Sexual Activity    Alcohol use: Yes     Comment: daily ETOH in past, recently has a 6 pk of beer on weekends    Drug use: No    Sexual activity: Yes     Partners: Female   Other Topics Concern    Not on file   Social History Narrative    Not on file     Social Determinants of Health     Financial Resource Strain: Not on file   Food Insecurity: Not on file   Transportation Needs: Not on file   Physical Activity: Not on file   Stress: Not on file   Social Connections: Not on file   Intimate Partner Violence: Not on file   Housing Stability: Not on file      Family History   Problem Relation Age of Onset    Deep vein thrombosis Mother     Multiple sclerosis Mother     Hypertension Father      Past Surgical History:   Procedure Laterality Date    BACK SURGERY      COLONOSCOPY      HERNIA REPAIR Right     inguinal    IVC FILTER INSERTION      JOINT REPLACEMENT      l tkr and r thr    LARYNGOSCOPY      LUMBAR SPINE SURGERY      OK AMPUTATION TOE INTERPHALANGEAL JOINT Left 4/25/2017    Procedure: 2ND TOE AMPUTATION;  Surgeon: Rita Caceres DPM;  Location: [facility];  Service: Podiatry    OK OPEN TX PHALANGEAL SHAFT FRACTURE PROX/MIDDLE EA Left 3/28/2017    Procedure: OPEN REDUCTION W/ INTERNAL FIXATION (ORIF)PROXIMAL PHALANX RING FINGER;  Surgeon: Latonya Castellano MD;  Location: [facility];  Service: Orthopedics       Current Outpatient Medications:     amitriptyline (ELAVIL) 150 MG tablet, Take 150 mg by mouth daily, Disp: , Rfl:     aspirin 81 MG tablet, Take 81 mg by mouth daily, Disp: , Rfl:     atorvastatin (LIPITOR) 20 mg tablet, Take 20 mg by mouth daily, Disp: , Rfl:     carvedilol (COREG) 6.25 mg tablet, TAKE 1 TABLET BY MOUTH TWICE A DAY WITH MEALS, Disp: 180 tablet, Rfl: 3    Cholecalciferol (VITAMIN D3) 1000 units CAPS, Take by mouth, Disp: , Rfl:     clotrimazole-betamethasone (LOTRISONE) 1-0.05 % cream, Apply topically, Disp: , Rfl:     cyanocobalamin (VITAMIN B-12) 1,000 mcg tablet, Take 1,000 mcg by mouth daily, Disp: , Rfl:     diclofenac (VOLTAREN) 75 mg EC tablet, , Disp: , Rfl:     DULoxetine (CYMBALTA) 60 mg delayed release capsule, , Disp: , Rfl:     folic acid (FOLVITE) 1 mg tablet, Take 1 mg by mouth daily, Disp: , Rfl:     furosemide (LASIX) 40 mg tablet, Take 1 tablet (40 mg total) by mouth daily, Disp: 30 tablet, Rfl: 6    gabapentin (NEURONTIN) 800 mg tablet, Take 800 mg by mouth 2 (two) times a day, Disp: , Rfl:     levothyroxine 50 mcg tablet, Take 50 mcg by mouth daily, Disp: , Rfl:     Lidocaine-Menthol 4-5 % PTCH, Apply topically, Disp: , Rfl:     lisinopril (ZESTRIL) 40 mg tablet, Take 40 mg by mouth daily, Disp: , Rfl:     metFORMIN (GLUCOPHAGE) 1000 MG tablet, Take 1,000 mg by mouth 2 (two) times a day with meals, Disp: , Rfl:     mupirocin (BACTROBAN) 2 % ointment, Apply topically daily Left third toe, Disp: 22 g, Rfl: 1    albuterol (Ventolin HFA) 90 mcg/act inhaler, Inhale 1-2 puffs every 6 (six) hours as needed for wheezing or shortness of breath (Patient not taking: Reported on 10/11/2023), Disp: 8 g, Rfl: 0    HYDROcodone-acetaminophen (NORCO) 5-325 mg per tablet, Take 1 tablet by mouth every 4 (four) hours as needed (Patient not taking: Reported on 10/11/2023), Disp: , Rfl:     traMADol (ULTRAM) 50 mg tablet, , Disp: , Rfl:       Review of Systems:  Review of Systems   Constitutional:  Negative for appetite change, chills, diaphoresis, fatigue and fever. Respiratory:  Negative for cough, chest tightness and shortness of breath. Cardiovascular:  Negative for chest pain, palpitations and leg swelling. Gastrointestinal:  Negative for diarrhea, nausea and vomiting. Endocrine: Negative for cold intolerance and heat intolerance. Genitourinary:  Negative for difficulty urinating, dysuria and enuresis. Musculoskeletal:  Negative for arthralgias, back pain and gait problem. Allergic/Immunologic: Negative for environmental allergies and food allergies. Neurological:  Negative for dizziness, facial asymmetry and headaches. Hematological:  Negative for adenopathy. Does not bruise/bleed easily. Psychiatric/Behavioral:  Negative for agitation, behavioral problems and confusion. Physical Exam:  Physical Exam  Constitutional:       Appearance: He is well-developed. HENT:      Right Ear: External ear normal.      Left Ear: External ear normal.   Eyes:      Pupils: Pupils are equal, round, and reactive to light. Cardiovascular:      Rate and Rhythm: Normal rate and regular rhythm. Heart sounds: Normal heart sounds. No murmur heard. No friction rub. No gallop.    Pulmonary: Effort: Pulmonary effort is normal.      Breath sounds: Normal breath sounds. Abdominal:      Palpations: Abdomen is soft. Musculoskeletal:         General: Normal range of motion. Cervical back: Normal range of motion. Skin:     General: Skin is warm and dry. Neurological:      Mental Status: He is alert and oriented to person, place, and time. Deep Tendon Reflexes: Reflexes are normal and symmetric. Psychiatric:         Behavior: Behavior normal.         Thought Content: Thought content normal.         Judgment: Judgment normal.         This note was completed in part utilizing M-Modal Fluency Direct Software. Grammatical errors, random word insertions, spelling mistakes, and incomplete sentences can be an occasional consequence of this system secondary to software limitations, ambient noise, and hardware issues. If you have any questions or concerns about the content, text, or information contained within the body of this dictation, please contact the provider for clarification.

## 2023-10-12 ENCOUNTER — OFFICE VISIT (OUTPATIENT)
Dept: PODIATRY | Facility: CLINIC | Age: 68
End: 2023-10-12
Payer: COMMERCIAL

## 2023-10-12 VITALS
WEIGHT: 220 LBS | SYSTOLIC BLOOD PRESSURE: 148 MMHG | HEIGHT: 70 IN | BODY MASS INDEX: 31.5 KG/M2 | DIASTOLIC BLOOD PRESSURE: 71 MMHG | HEART RATE: 73 BPM

## 2023-10-12 DIAGNOSIS — B35.1 ONYCHOMYCOSIS: Primary | ICD-10-CM

## 2023-10-12 DIAGNOSIS — E11.40 TYPE 2 DIABETES MELLITUS WITH DIABETIC NEUROPATHY, WITHOUT LONG-TERM CURRENT USE OF INSULIN (HCC): ICD-10-CM

## 2023-10-12 DIAGNOSIS — L84 CORNS AND CALLUS: ICD-10-CM

## 2023-10-12 PROCEDURE — 11721 DEBRIDE NAIL 6 OR MORE: CPT | Performed by: PODIATRIST

## 2023-10-12 PROCEDURE — 11056 PARNG/CUTG B9 HYPRKR LES 2-4: CPT | Performed by: PODIATRIST

## 2023-12-04 NOTE — PROGRESS NOTES
PATIENT:  Patricia Andrews  1955    ASSESSMENT:  1. Onychomycosis        2. Corns and callus        3. Type 2 diabetes mellitus with diabetic neuropathy, without long-term current use of insulin (HCC)              No orders of the defined types were placed in this encounter. PLAN:  Disease prevention and related risk factors of diabetes were identified and discussed. The patient was educated in proper foot wear for diabetics. Educated in daily foot assessment and routine diabetic foot care. Discussed the importance of controlling BS through diet and exercise. The patient will follow up in 9-12 weeks for further diabetic foot exam and care. Procedures: 37371, 19135  All mycotic toenails were reduced and debrided in length, width, and girth using a nail nipper and electric dremel. All hyperkeratotic skin lesion(s) if present were sharply pared with a #10 scalpel with no evidence of ulceration/abscess. Patient tolerated procedure(s) well without complications. Procedures     HPI:  Patricia Andrews is a 77 y. o.year old male seen for diabetic foot exam.  The patient has class findings with diabetes and chronic Charcot breakdown involving the right foot. BS is under control. The patient complained of thick toenails painful lesions. The patient denied any acute pedal disorder, redness, acute swelling, or recent injury.       The following portions of the patient's history were reviewed and updated as appropriate: allergies, current medications, past family history, past medical history, past social history, past surgical history and problem list.    REVIEW OF SYSTEMS:  GENERAL: No fever or chills  HEART: No chest pain, or palpitation  RESPIRATORY:  No acute SOB or cough  GI: No Nausea, vomit or diarrhea  NEUROLOGIC: No syncope or acute weakness    PHYSICAL EXAM:    /71 (BP Location: Right arm, Patient Position: Sitting, Cuff Size: Adult)   Pulse 73   Ht 5' 10" (1.778 m) Comment: verbal  Wt 99.8 kg (220 lb)   BMI 31.57 kg/m²     VASCULAR EXAM  Posterior tibial artery absent bilateral  Dorsalis pedis artery +1 bilateral  The patient has class findings with skin atrophy, lack of digital hair, and nail dystrophy. There is no lower extremity edema bilaterally. NEUROLOGIC EXAM  Sensation is intact to light touch. No   Sensation is absent to 10gm monofilament. Vibratory sensation absent. Tingling/numb paresthesias noted bilateral.         DERMATOLOGIC EXAM:   Texture, Tone and Turgor are diminished bilateral.  The patient has dystrophic/hypertrophic toenails with yellow/white discoloration, onycholysis, and subungal debris. Fungal odor noted. Brittle nature noted. Right foot nails dystrophic x5 with 0.40 cm ave thickness (1 through 5)  Left foot nails dystrophic x4 with 0.45 cm ave thickness (1, 345)  Patient has hyperkeratotic lesions noted:  Right foot located at none. Left foot located at distal tip third toe, and left hallux IPJ plantar medial  No notable suspicious skin lesions. MUSCULOSKELETAL EXAM:   No acute joint pain, edema, or redness. No acute musculoskeletal problem. Patient has Charcot deformities right midfoot. Patient has mild ambulation limitations. Patient wears DM shoes? Yes    Risk Category/Class Findings:  Q 8 (B1, B2 ABC)  1 = Loss of protective sensation    A1)  Has the patient had a previous amputation of the foot or integral skeletal portion thereof? Yes (left #2 toe amputation)  B1)  Does the patient have absent posterior tibial pulse? Yes  B3)  Does the patient have absent dorsalis pedis? No  B2)  Does the patient have three of the following? Yes           1. Hair growth (increased or decreased)  C)  Does the patient have two of the following and one above? Yes            3.  Edema, 4. Paraesthesias (abnormal spontaneous sensations in the feet) and 5.   Burning

## 2023-12-15 ENCOUNTER — OFFICE VISIT (OUTPATIENT)
Dept: PODIATRY | Facility: CLINIC | Age: 68
End: 2023-12-15
Payer: COMMERCIAL

## 2023-12-15 VITALS
HEIGHT: 70 IN | BODY MASS INDEX: 31.5 KG/M2 | WEIGHT: 220 LBS | DIASTOLIC BLOOD PRESSURE: 80 MMHG | SYSTOLIC BLOOD PRESSURE: 136 MMHG | HEART RATE: 60 BPM

## 2023-12-15 DIAGNOSIS — B35.1 ONYCHOMYCOSIS: Primary | ICD-10-CM

## 2023-12-15 DIAGNOSIS — E11.40 TYPE 2 DIABETES MELLITUS WITH DIABETIC NEUROPATHY, WITHOUT LONG-TERM CURRENT USE OF INSULIN (HCC): ICD-10-CM

## 2023-12-15 DIAGNOSIS — L97.522 DIABETIC ULCER OF TOE OF LEFT FOOT ASSOCIATED WITH TYPE 2 DIABETES MELLITUS, WITH FAT LAYER EXPOSED (HCC): ICD-10-CM

## 2023-12-15 DIAGNOSIS — E11.621 DIABETIC ULCER OF TOE OF LEFT FOOT ASSOCIATED WITH TYPE 2 DIABETES MELLITUS, WITH FAT LAYER EXPOSED (HCC): ICD-10-CM

## 2023-12-15 PROCEDURE — 11721 DEBRIDE NAIL 6 OR MORE: CPT | Performed by: PODIATRIST

## 2023-12-15 PROCEDURE — 11045 DBRDMT SUBQ TISS EACH ADDL: CPT | Performed by: PODIATRIST

## 2023-12-15 PROCEDURE — 11042 DBRDMT SUBQ TIS 1ST 20SQCM/<: CPT | Performed by: PODIATRIST

## 2023-12-15 RX ORDER — DOXYCYCLINE HYCLATE 100 MG/1
100 CAPSULE ORAL EVERY 12 HOURS SCHEDULED
Qty: 14 CAPSULE | Refills: 0 | Status: SHIPPED | OUTPATIENT
Start: 2023-12-15 | End: 2023-12-22

## 2023-12-15 NOTE — PROGRESS NOTES
Patient ID: Long Mi is a 68 y.o. male Date of Birth 1955       Chief Complaint   Patient presents with   • Diabetes Type 2   • Foot Ulcer     Left foot middle toe sore       Allergies:  Penicillins, Rocephin [ceftriaxone], and Robaxin [methocarbamol]    Diagnosis:  1. Onychomycosis    2. Type 2 diabetes mellitus with diabetic neuropathy, without long-term current use of insulin ([facility])    3. Diabetic ulcer of toe of left foot associated with type 2 diabetes mellitus, with fat layer exposed ([facility])  -     mupirocin (BACTROBAN) 2 % ointment; Apply topically daily  -     doxycycline hyclate (VIBRAMYCIN) 100 mg capsule; Take 1 capsule (100 mg total) by mouth every 12 (twelve) hours for 7 days  -     Debridement Diabetic Ulcer Left;Distal Toe (Comment  which one) (#3)       Diagnosis ICD-10-CM Associated Orders   1. Onychomycosis  B35.1       2. Type 2 diabetes mellitus with diabetic neuropathy, without long-term current use of insulin ([facility])  E11.40       3. Diabetic ulcer of toe of left foot associated with type 2 diabetes mellitus, with fat layer exposed ([facility])  E11.621 mupirocin (BACTROBAN) 2 % ointment    L97.522 doxycycline hyclate (VIBRAMYCIN) 100 mg capsule     Debridement Diabetic Ulcer Left;Distal Toe (Comment  which one) (#3)           Assessment & Plan:  Debridement of ulceration as noted below.  Debridement of mycotic nails as noted below.  Dry sterile dressing with mupirocin/alginate applied to distal tip of left third toe.  Rx mupirocin ointment to be applied with each bandage change.  Rx doxycycline 100 mg twice daily to address localized erythema/cellulitis starting.  Follow-up in 2 to 3 weeks.       Procedure:  All mycotic toenails were reduced and debrided in length, width, and girth using a nail nipper and electric dremel.    All hyperkeratotic skin lesion(s) were sharply pared with a scalpel #10 blade with no evidence of ulceration beneath.    Patient tolerated procedure(s) well without  complications.      Risk Category/Class Findings:  Q7(#2 Toe Amp LFT)  3 = History of plantar ulceration, neuropathic fracture (Charcot foot) or amputation    A1)  Has the patient had a previous amputation of the foot or integral skeletal portion thereof? Yes  B1)  Does the patient have absent posterior tibial pulse? Yes  B3)  Does the patient have absent dorsalis pedis? No  B2)  Does the patient have three of the following? Yes           1.  Hair growth (increased or decreased), 2.  Nail changes (thickening), and 3.  Pigmentary changes (discoloring)  C)  Does the patient have two of the following and one above? Yes            3.  Edema and 4.  Paraesthesias (abnormal spontaneous sensations in the feet)         Subjective:   12/15/2023: 68-year-old type II diabetic seen today for follow-up routine diabetic footcare concerned with discoloration now of the tip of his left third toe.        The following portions of the patient's history were reviewed and updated as appropriate:   Patient Active Problem List   Diagnosis   • Nonischemic cardiomyopathy (HCC)   • Closed displaced fracture of proximal phalanx of ring finger   • Abnormal stress test   • Benign essential hypertension   • Arteriosclerosis of coronary artery   • Chronic pain syndrome   • Diabetes mellitus type 2 with neurological manifestations (HCC)   • LBBB (left bundle branch block)   • Pyogenic inflammation of bone ([facility])   • Pain in joint of right hip   • Right knee pain   • Skin ulcer of toe of left foot with fat layer exposed (HCC)   • Spinal stenosis   • Spondylosis of 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   • Left thigh pain   • Sacroiliitis (HCC)   • Chronic left-sided low back pain without sciatica   • Lumbar foraminal stenosis   • Lumbar radiculopathy   • Tobacco abuse     Past Medical History:   Diagnosis Date   • Arthritis    • Colon polyps    • Current every day smoker    • Diabetes mellitus (HCC)     type II   • Drinks beer    • DVT  (deep venous thrombosis) (HCC)     l leg   • Full dentures    • Hyperlipidemia    • Hypertension    • Vocal cord polyps      Past Surgical History:   Procedure Laterality Date   • BACK SURGERY     • COLONOSCOPY     • HERNIA REPAIR Right     inguinal   • IVC FILTER INSERTION     • JOINT REPLACEMENT      l tkr and r thr   • LARYNGOSCOPY     • LUMBAR SPINE SURGERY     • MS AMPUTATION TOE INTERPHALANGEAL JOINT Left 2017    Procedure: 2ND TOE AMPUTATION;  Surgeon: Romulo Frost DPM;  Location:  MAIN OR;  Service: Podiatry   • MS OPEN TX PHALANGEAL SHAFT FRACTURE PROX/MIDDLE EA Left 3/28/2017    Procedure: OPEN REDUCTION W/ INTERNAL FIXATION (ORIF)PROXIMAL PHALANX RING FINGER;  Surgeon: Teto Foley MD;  Location: [facility];  Service: Orthopedics     Social History     Socioeconomic History   • Marital status: /Civil Union     Spouse name: None   • Number of children: None   • Years of education: None   • Highest education level: None   Occupational History   • None   Tobacco Use   • Smoking status: Former     Current packs/day: 0.00     Types: Cigarettes     Quit date:      Years since quittin.9   • Smokeless tobacco: Never   • Tobacco comments:     X 40+ yrs.   Vaping Use   • Vaping status: Never Used   Substance and Sexual Activity   • Alcohol use: Yes     Comment: daily ETOH in past, recently has a 6 pk of beer on weekends   • Drug use: No   • Sexual activity: Yes     Partners: Female   Other Topics Concern   • None   Social History Narrative   • None     Social Determinants of Health     Financial Resource Strain: Not on file   Food Insecurity: Not on file   Transportation Needs: Not on file   Physical Activity: Not on file   Stress: Not on file   Social Connections: Not on file   Intimate Partner Violence: Not on file   Housing Stability: Not on file        Current Outpatient Medications:   •  amitriptyline (ELAVIL) 150 MG tablet, Take 150 mg by mouth daily, Disp: , Rfl:   •   aspirin 81 MG tablet, Take 81 mg by mouth daily, Disp: , Rfl:   •  atorvastatin (LIPITOR) 20 mg tablet, Take 20 mg by mouth daily, Disp: , Rfl:   •  carvedilol (COREG) 6.25 mg tablet, TAKE 1 TABLET BY MOUTH TWICE A DAY WITH MEALS, Disp: 180 tablet, Rfl: 3  •  Cholecalciferol (VITAMIN D3) 1000 units CAPS, Take by mouth, Disp: , Rfl:   •  clotrimazole-betamethasone (LOTRISONE) 1-0.05 % cream, Apply topically, Disp: , Rfl:   •  cyanocobalamin (VITAMIN B-12) 1,000 mcg tablet, Take 1,000 mcg by mouth daily, Disp: , Rfl:   •  diclofenac (VOLTAREN) 75 mg EC tablet, , Disp: , Rfl:   •  doxycycline hyclate (VIBRAMYCIN) 100 mg capsule, Take 1 capsule (100 mg total) by mouth every 12 (twelve) hours for 7 days, Disp: 14 capsule, Rfl: 0  •  DULoxetine (CYMBALTA) 60 mg delayed release capsule, , Disp: , Rfl:   •  folic acid (FOLVITE) 1 mg tablet, Take 1 mg by mouth daily, Disp: , Rfl:   •  furosemide (LASIX) 40 mg tablet, Take 1 tablet (40 mg total) by mouth daily, Disp: 30 tablet, Rfl: 6  •  gabapentin (NEURONTIN) 800 mg tablet, Take 800 mg by mouth 2 (two) times a day, Disp: , Rfl:   •  levothyroxine 50 mcg tablet, Take 50 mcg by mouth daily, Disp: , Rfl:   •  Lidocaine-Menthol 4-5 % PTCH, Apply topically, Disp: , Rfl:   •  lisinopril (ZESTRIL) 40 mg tablet, Take 40 mg by mouth daily, Disp: , Rfl:   •  metFORMIN (GLUCOPHAGE) 1000 MG tablet, Take 1,000 mg by mouth 2 (two) times a day with meals, Disp: , Rfl:   •  mupirocin (BACTROBAN) 2 % ointment, Apply topically daily Left third toe, Disp: 22 g, Rfl: 1  •  mupirocin (BACTROBAN) 2 % ointment, Apply topically daily, Disp: 22 g, Rfl: 0  •  albuterol (Ventolin HFA) 90 mcg/act inhaler, Inhale 1-2 puffs every 6 (six) hours as needed for wheezing or shortness of breath (Patient not taking: Reported on 10/11/2023), Disp: 8 g, Rfl: 0  •  HYDROcodone-acetaminophen (NORCO) 5-325 mg per tablet, Take 1 tablet by mouth every 4 (four) hours as needed (Patient not taking: Reported on  "10/11/2023), Disp: , Rfl:   •  traMADol (ULTRAM) 50 mg tablet, , Disp: , Rfl:   Family History   Problem Relation Age of Onset   • Deep vein thrombosis Mother    • Multiple sclerosis Mother    • Hypertension Father       Review of Systems   Constitutional: Negative.    HENT: Negative.     Eyes: Negative.    Respiratory: Negative.     Cardiovascular: Negative.    Gastrointestinal: Negative.    Endocrine:        Type 2 diabetes   Genitourinary: Negative.    Musculoskeletal: Negative.    Skin:  Positive for wound.        Multiple thick yellow dystrophic nails and concern of ulceration tip of left third toe.   Allergic/Immunologic: Negative.    Neurological:  Positive for seizures and numbness.   Hematological: Negative.    Psychiatric/Behavioral: Negative.           Objective:  /80 (BP Location: Left arm, Patient Position: Sitting, Cuff Size: Adult)   Pulse 60   Ht 5' 10\" (1.778 m) Comment: verbal  Wt 99.8 kg (220 lb)   BMI 31.57 kg/m²     Physical Exam  Constitutional:       Appearance: Normal appearance.   HENT:      Head: Normocephalic.      Right Ear: Tympanic membrane normal.      Left Ear: Tympanic membrane normal.      Nose: No congestion.      Mouth/Throat:      Mouth: Mucous membranes are moist.      Pharynx: No oropharyngeal exudate or posterior oropharyngeal erythema.   Eyes:      Extraocular Movements: Extraocular movements intact.      Conjunctiva/sclera: Conjunctivae normal.      Pupils: Pupils are equal, round, and reactive to light.   Cardiovascular:      Rate and Rhythm: Normal rate and regular rhythm.      Pulses:           Dorsalis pedis pulses are 1+ on the right side and 1+ on the left side.        Posterior tibial pulses are 0 on the right side and 0 on the left side.   Pulmonary:      Effort: Pulmonary effort is normal.   Abdominal:      Palpations: Abdomen is soft.   Musculoskeletal:         General: Deformity (Right Charcot foot deformity with prominent plantar lateral cuboid.) " present.      Right foot: Charcot foot present.      Left Lower Extremity: Left leg is amputated below ankle. (Left second toe)  Feet:      Right foot:      Protective Sensation: 10 sites tested.  2 sites sensed.      Toenail Condition: Right toenails are abnormally thick. Fungal disease present.     Left foot:      Protective Sensation: 10 sites tested.  2 sites sensed.      Skin integrity: Ulcer (See notes), erythema and warmth present.      Toenail Condition: Left toenails are abnormally thick. Fungal disease present.     Comments: Left third toe: Distal tip has full-thickness necrotic ulcer with hyperkeratotic margin in nature.  Mild erythema distal toe.  Undermining and slight maceration noted.  No signs of infection.  Wound measures 1.0 x 2.0 x 0.2 cm and stretches across the entire distal tip of the toe medial to lateral.  Skin:     General: Skin is warm and dry.      Capillary Refill: Capillary refill takes 2 to 3 seconds.      Coloration: Skin is not pale.      Findings: No bruising, erythema, lesion or rash.      Comments: Severely dystrophic nails with 0.5 cm average thickness.  X 4 left, status post amputation left second toe, and x 5 right.  Subungual debris noted with brittle nature.     Neurological:      Mental Status: He is alert.      Cranial Nerves: No cranial nerve deficit.      Sensory: Sensory deficit (No vibratory or monofilament appreciated with loss of protective sensation.) present.      Motor: No weakness.      Gait: Gait normal.      Deep Tendon Reflexes: Reflexes normal.   Psychiatric:         Mood and Affect: Mood normal.         Behavior: Behavior normal.         Judgment: Judgment normal.         Wound 01/09/23 Diabetic Ulcer Foot Right;Plantar;Mid;Lateral (Active)       Wound 01/09/23 Diabetic Ulcer Toe (Comment  which one) Left;Distal (Active)       Debridement   Wound 01/09/23 Diabetic Ulcer Toe (Comment  which one) Left;Distal    Universal Protocol:  Consent: Verbal consent  "obtained.  Risks and benefits: risks, benefits and alternatives were discussed  Consent given by: patient  Time out: Immediately prior to procedure a \"time out\" was called to verify the correct patient, procedure, equipment, support staff and site/side marked as required.  Patient understanding: patient states understanding of the procedure being performed  Patient identity confirmed: verbally with patient    Debridement Details  Performed by: physician  Debridement type: surgical  Level of debridement: subcutaneous tissue  Pain control: lidocaine 4%      Post-debridement measurements  Length (cm): 1  Width (cm): 2  Depth (cm): 0.2  Percent debrided: 100%  Surface Area (cm^2): 2  Area Debrided (cm^2): 2  Volume (cm^3): 0.4    Tissue and other material debrided: subcutaneous tissue  Instrument(s) utilized: nippers, curette and blade  Bleeding: small  Hemostasis obtained with: pressure  Procedural pain (0-10): 3  Post-procedural pain: 3   Response to treatment: procedure was tolerated well                 Wound Instructions:  Orders Placed This Encounter   Procedures   • Debridement Diabetic Ulcer Left;Distal Toe (Comment  which one) (#3)     This order was created via procedure documentation         Romulo Frost DPM      Portions of the record may have been created with voice recognition software. Occasional wrong word or \"sound a like\" substitutions may have occurred due to the inherent limitations of voice recognition software. Read the chart carefully and recognize, using context, where substitutions have occurred.    "

## 2024-01-12 ENCOUNTER — OFFICE VISIT (OUTPATIENT)
Dept: PODIATRY | Facility: CLINIC | Age: 69
End: 2024-01-12
Payer: COMMERCIAL

## 2024-01-12 VITALS
HEIGHT: 70 IN | SYSTOLIC BLOOD PRESSURE: 158 MMHG | WEIGHT: 220 LBS | BODY MASS INDEX: 31.5 KG/M2 | DIASTOLIC BLOOD PRESSURE: 85 MMHG

## 2024-01-12 DIAGNOSIS — B35.1 ONYCHOMYCOSIS: Primary | ICD-10-CM

## 2024-01-12 DIAGNOSIS — E11.40 TYPE 2 DIABETES MELLITUS WITH DIABETIC NEUROPATHY, WITHOUT LONG-TERM CURRENT USE OF INSULIN (HCC): ICD-10-CM

## 2024-01-12 DIAGNOSIS — L84 CORNS AND CALLUS: ICD-10-CM

## 2024-01-12 PROCEDURE — 11056 PARNG/CUTG B9 HYPRKR LES 2-4: CPT | Performed by: PODIATRIST

## 2024-01-12 PROCEDURE — 11721 DEBRIDE NAIL 6 OR MORE: CPT | Performed by: PODIATRIST

## 2024-01-13 NOTE — PROGRESS NOTES
"   PATIENT:  Long Mi  1955    ASSESSMENT:  1. Onychomycosis        2. Corns and callus        3. Type 2 diabetes mellitus with diabetic neuropathy, without long-term current use of insulin (HCC)              No orders of the defined types were placed in this encounter.       PLAN:  Disease prevention and related risk factors of diabetes were identified and discussed.    The patient was educated in proper foot wear for diabetics.    Educated in daily foot assessment and routine diabetic foot care.    Discussed the importance of controlling BS through diet and exercise.    The patient will follow up in 12 weeks for further diabetic foot exam and care.      Procedures: 33370, 11784  All mycotic toenails were reduced and debrided in length, width, and girth using a nail nipper and electric dremel.    All hyperkeratotic skin lesion(s) if present were sharply pared with a #10 scalpel with no evidence of ulceration/abscess.    Patient tolerated procedure(s) well without complications.    Procedures     HPI:  Long Mi is a 68 y.o.year old male seen for diabetic foot exam.  The patient has class findings with diabetes and chronic Charcot breakdown involving the right foot.     BS is under control.  The patient complained of thick toenails painful lesions.    The patient denied any acute pedal disorder, redness, acute swelling, or recent injury.      The following portions of the patient's history were reviewed and updated as appropriate: allergies, current medications, past family history, past medical history, past social history, past surgical history and problem list.    REVIEW OF SYSTEMS:  GENERAL: No fever or chills  HEART: No chest pain, or palpitation  RESPIRATORY:  No acute SOB or cough  GI: No Nausea, vomit or diarrhea  NEUROLOGIC: No syncope or acute weakness    PHYSICAL EXAM:    /85 (BP Location: Left arm, Patient Position: Sitting, Cuff Size: Adult)   Ht 5' 10\" (1.778 m)   Wt 99.8 kg " (220 lb)   BMI 31.57 kg/m²     VASCULAR EXAM  Posterior tibial artery absent bilateral  Dorsalis pedis artery +1 bilateral  The patient has class findings with skin atrophy, lack of digital hair, and nail dystrophy.    There is no lower extremity edema bilaterally.      NEUROLOGIC EXAM  Sensation is intact to light touch. No   Sensation is absent to 10gm monofilament.  Vibratory sensation absent.     Tingling/numb paresthesias noted bilateral.         DERMATOLOGIC EXAM:   Texture, Tone and Turgor are diminished bilateral.  The patient has dystrophic/hypertrophic toenails with yellow/white discoloration, onycholysis, and subungal debris.   Fungal odor noted.  Brittle nature noted.  Right foot nails dystrophic x5 with 0.40 cm ave thickness (1 through 5)  Left foot nails dystrophic x4 with 0.45 cm ave thickness (1, 345)  Patient has hyperkeratotic lesions noted:  Right foot located at none.  Left foot located at distal tip 3rd toe, and left hallux IPJ plantar medial  No notable suspicious skin lesions.      MUSCULOSKELETAL EXAM:   No acute joint pain, edema, or redness.  No acute musculoskeletal problem.    Patient has Charcot deformities right midfoot.  Patient has mild ambulation limitations.      Patient wears DM shoes? Yes    Risk Category/Class Findings:  Q7(Amp left #2 Toe)  1 = Loss of protective sensation    A1)  Has the patient had a previous amputation of the foot or integral skeletal portion thereof? Yes (left #2 toe amputation)  B1)  Does the patient have absent posterior tibial pulse? Yes  B3)  Does the patient have absent dorsalis pedis? No  B2)  Does the patient have three of the following? Yes           1.  Hair growth (increased or decreased)  C)  Does the patient have two of the following and one above? Yes            3.  Edema, 4.  Paraesthesias (abnormal spontaneous sensations in the feet) and 5.  Burning

## 2024-04-05 ENCOUNTER — OFFICE VISIT (OUTPATIENT)
Dept: PODIATRY | Facility: CLINIC | Age: 69
End: 2024-04-05
Payer: COMMERCIAL

## 2024-04-05 VITALS
SYSTOLIC BLOOD PRESSURE: 134 MMHG | DIASTOLIC BLOOD PRESSURE: 74 MMHG | WEIGHT: 222 LBS | HEIGHT: 70 IN | BODY MASS INDEX: 31.78 KG/M2 | HEART RATE: 76 BPM

## 2024-04-05 DIAGNOSIS — B35.1 ONYCHOMYCOSIS: Primary | ICD-10-CM

## 2024-04-05 DIAGNOSIS — E11.40 TYPE 2 DIABETES MELLITUS WITH DIABETIC NEUROPATHY, WITHOUT LONG-TERM CURRENT USE OF INSULIN (HCC): ICD-10-CM

## 2024-04-05 DIAGNOSIS — E11.610 DIABETIC CHARCOT FOOT (HCC): ICD-10-CM

## 2024-04-05 DIAGNOSIS — L84 CORNS AND CALLUS: ICD-10-CM

## 2024-04-05 PROCEDURE — 11721 DEBRIDE NAIL 6 OR MORE: CPT | Performed by: PODIATRIST

## 2024-04-05 PROCEDURE — 11056 PARNG/CUTG B9 HYPRKR LES 2-4: CPT | Performed by: PODIATRIST

## 2024-04-05 NOTE — PROGRESS NOTES
PATIENT:  Long Mi  1955    ASSESSMENT:  1. Onychomycosis        2. Corns and callus        3. Type 2 diabetes mellitus with diabetic neuropathy, without long-term current use of insulin (HCC)  Diabetic Shoe      4. Diabetic Charcot foot (HCC)  Diabetic Shoe            Orders Placed This Encounter   Procedures   • Diabetic Shoe          PLAN:  Disease prevention and related risk factors of diabetes were identified and discussed.    The patient was educated in proper foot wear for diabetics.    Educated in daily foot assessment and routine diabetic foot care.    Discussed the importance of controlling BS through diet and exercise.    The patient will follow up in 12 weeks for further diabetic foot exam and care.  Rx DM shoes which are medically necessary to accommodate the Charcot deformity of his right foot and prevent recurrent plantar ulcerations.    Procedures: 13039, 58597  All mycotic toenails were reduced and debrided in length, width, and girth using a nail nipper and electric dremel.    All hyperkeratotic skin lesion(s) if present were sharply pared with a #10 scalpel with no evidence of ulceration/abscess.    Patient tolerated procedure(s) well without complications.    Procedures     HPI:  Long Mi is a 68 y.o.year old male seen for diabetic foot exam.  The patient has class findings with diabetes and chronic Charcot breakdown involving the right foot.   Request prescription for new diabetic shoes to accommodate his right foot deformity.  Current shoes are worn.  BS is under control.  The patient complained of thick toenails painful lesions.   The patient denied any acute pedal disorder, redness, acute swelling, or recent injury.      The following portions of the patient's history were reviewed and updated as appropriate: allergies, current medications, past family history, past medical history, past social history, past surgical history and problem list.    REVIEW OF  "SYSTEMS:  GENERAL: No fever or chills  HEART: No chest pain, or palpitation  RESPIRATORY:  No acute SOB or cough  GI: No Nausea, vomit or diarrhea  NEUROLOGIC: No syncope or acute weakness    PHYSICAL EXAM:    /74 (BP Location: Left arm, Patient Position: Sitting, Cuff Size: Adult)   Pulse 76   Ht 5' 10\" (1.778 m) Comment: STATED  Wt 101 kg (222 lb)   BMI 31.85 kg/m²     VASCULAR EXAM  Posterior tibial artery absent bilateral  Dorsalis pedis artery +1 bilateral  The patient has class findings with skin atrophy, lack of digital hair, and nail dystrophy.    There is no lower extremity edema bilaterally.      NEUROLOGIC EXAM  Sensation is intact to light touch. No   Sensation is absent to 10gm monofilament.  Vibratory sensation absent.     Tingling/numb paresthesias noted bilateral.         DERMATOLOGIC EXAM:   Texture, Tone and Turgor are diminished bilateral.  The patient has dystrophic/hypertrophic toenails with yellow/white discoloration, onycholysis, and subungal debris.   Fungal odor noted.  Brittle nature noted.  Right foot nails dystrophic x5 with 0.40 cm ave thickness (1 through 5)  Left foot nails dystrophic x4 with 0.45 cm ave thickness (1, 345)  Patient has hyperkeratotic lesions noted:  Right foot located at none.  Left foot located at distal tip 3rd toe, and left hallux IPJ plantar medial  No notable suspicious skin lesions.      MUSCULOSKELETAL EXAM:   No acute joint pain, edema, or redness.  No acute musculoskeletal problem.    Patient has moderate Charcot deformity right midfoot with extremely prominent plantar cuboid.    Patient has mild ambulation limitations and is going to PT.      Patient wears DM shoes? Yes    Risk Category/Class Findings:  Q7(Amp left #2 Toe)  1 = Loss of protective sensation    A1)  Has the patient had a previous amputation of the foot or integral skeletal portion thereof? Yes (left #2 toe amputation)  B1)  Does the patient have absent posterior tibial pulse? Yes  B3) "  Does the patient have absent dorsalis pedis? No  B2)  Does the patient have three of the following? Yes           1.  Hair growth (increased or decreased)  C)  Does the patient have two of the following and one above? Yes            3.  Edema, 4.  Paraesthesias (abnormal spontaneous sensations in the feet) and 5.  Burning

## 2024-04-22 ENCOUNTER — TELEPHONE (OUTPATIENT)
Dept: PODIATRY | Facility: CLINIC | Age: 69
End: 2024-04-22

## 2024-04-22 ENCOUNTER — TELEPHONE (OUTPATIENT)
Dept: OBGYN CLINIC | Facility: HOSPITAL | Age: 69
End: 2024-04-22

## 2024-04-22 NOTE — TELEPHONE ENCOUNTER
Caller: Mario Alberto Aguiar     Doctor/Office: Santosh MOSLEY#: [phone]      What needs to be faxed: OV 4/5/24    Fax#: [phone]      Documents were successfully e-faxed

## 2024-07-31 ENCOUNTER — OFFICE VISIT (OUTPATIENT)
Dept: CARDIOLOGY CLINIC | Facility: CLINIC | Age: 69
End: 2024-07-31
Payer: COMMERCIAL

## 2024-07-31 VITALS
SYSTOLIC BLOOD PRESSURE: 112 MMHG | HEIGHT: 70 IN | DIASTOLIC BLOOD PRESSURE: 64 MMHG | WEIGHT: 220.6 LBS | BODY MASS INDEX: 31.58 KG/M2 | HEART RATE: 68 BPM

## 2024-07-31 DIAGNOSIS — I50.22 CHRONIC HFREF (HEART FAILURE WITH REDUCED EJECTION FRACTION) (HCC): ICD-10-CM

## 2024-07-31 DIAGNOSIS — I70.213 ATHEROSCLEROSIS OF NATIVE ARTERIES OF EXTREMITIES WITH INTERMITTENT CLAUDICATION, BILATERAL LEGS (HCC): ICD-10-CM

## 2024-07-31 DIAGNOSIS — N18.32 CHRONIC KIDNEY DISEASE, STAGE 3B (HCC): ICD-10-CM

## 2024-07-31 DIAGNOSIS — I42.8 NONISCHEMIC CARDIOMYOPATHY (HCC): ICD-10-CM

## 2024-07-31 DIAGNOSIS — R79.89 ELEVATED SERUM CREATININE: Primary | ICD-10-CM

## 2024-07-31 PROCEDURE — 99214 OFFICE O/P EST MOD 30 MIN: CPT | Performed by: PHYSICIAN ASSISTANT

## 2024-07-31 RX ORDER — LANOLIN ALCOHOL/MO/W.PET/CERES
400 CREAM (GRAM) TOPICAL 2 TIMES DAILY
COMMUNITY
Start: 2024-07-27

## 2024-07-31 RX ORDER — LANOLIN ALCOHOL/MO/W.PET/CERES
CREAM (GRAM) TOPICAL EVERY 24 HOURS
COMMUNITY
Start: 2024-07-26

## 2024-07-31 RX ORDER — ACETAMINOPHEN 325 MG/1
TABLET ORAL
COMMUNITY

## 2024-07-31 RX ORDER — OXYCODONE HYDROCHLORIDE 5 MG/1
TABLET ORAL
COMMUNITY
Start: 2024-07-27

## 2024-07-31 RX ORDER — NICOTINE 21 MG/24HR
PATCH, TRANSDERMAL 24 HOURS TRANSDERMAL
COMMUNITY
Start: 2024-07-26 | End: 2024-08-25

## 2024-07-31 RX ORDER — APIXABAN 2.5 MG/1
1 TABLET, FILM COATED ORAL
COMMUNITY
Start: 2024-07-27 | End: 2024-08-25

## 2024-07-31 NOTE — PROGRESS NOTES
Cardiology Office Follow Up  Long Mi  1955  [id number]      ASSESSMENT:  Chronic HFmrEF, NICM (Etoh related), EF 40%  Nonobstructive CAD  Hypertension   Hyperlipidemia   LBBB  DM type 2  OA s/p L TKR with revision s/p periprosthetic fracture s/p L ORIF 7/5/2024  Chronic lower back pain  Hx of Tobacco/Etoh abuse      PLAN:  Stable CV wise; no Rx changes today   Currently added for Eliquis 2.5mg BID; no indication CV wise for this. Patient advised to follow up with his orthopedic provider in regards to this. Ok CV wise to discontinue ASA while on OAC to minimize risk of bleeding.   Otherwise continue cardiac Rx: ASA, Lipitor, Coreg, lisinopril  Grossly euvolemic on Lasix 40 mg daily  RTO in 3 to 6 months or sooner if needed.    Interval History/ HPI:   Long Mi is a 68-year-old male with PMHx as noted above who presents for hospital follow-up visit.  Admitted to [facility] in [place] on 7/5/2024 due to mechanical fall at home sustaining a left femur periprosthetic fracture requiring ORIF.  Patient reports he was at home and he got his foot stuck in his fridge rater at home and fell backwards onto the floor where he felt immediate left leg pain swelling and bruising.  His wife came home some 2 hours later and found him on the floor.  EMS was called for transfer to the hospital.  His hospital records are unavailable to me.  Per patient and wife who is present today reports that he had a significantly elevated creatinine on admission requiring nephrology consultation.  He was found with significant anemia as well and required 7 units PRBCs during his admission.  He was also evaluated by in-house cardiology team for preoperative cardiac risk assessment and received echo which showed stable EF of 35 to 40% and deemed acceptable CV risk to proceed for L ORIF on 7/5/2024.  He was discharged to a local SNF at [facility] and was there for 2 weeks prior to being discharged for  "home PT.  He is currently wheelchair-bound for the next few days. He has follow-up with his orthopedic surgeon on 2024.  He remains on his prior regimen of ASA, statin, Coreg, furosemide, lisinopril.  He was added for low-dose Eliquis 2.5 mg twice daily. Per wife who had his discharge paperwork in hand today states \"for prophylaxis\". Patient explains this is for DVT. He other denies any cardiac issues including Afib during his admission.       Past Medical History:   Diagnosis Date    Arthritis     Colon polyps     Current every day smoker     Diabetes mellitus ([facility])     type II    Drinks beer     DVT (deep venous thrombosis) ([facility])     l leg    Full dentures     Hyperlipidemia     Hypertension     Vocal cord polyps      Social History     Socioeconomic History    Marital status: /Civil Union     Spouse name: Not on file    Number of children: Not on file    Years of education: Not on file    Highest education level: Not on file   Occupational History    Not on file   Tobacco Use    Smoking status: Former     Current packs/day: 0.00     Types: Cigarettes     Quit date:      Years since quittin.5    Smokeless tobacco: Never    Tobacco comments:     X 40+ yrs.   Vaping Use    Vaping status: Never Used   Substance and Sexual Activity    Alcohol use: Yes     Comment: daily ETOH in past, recently has a 6 pk of beer on weekends    Drug use: No    Sexual activity: Yes     Partners: Female   Other Topics Concern    Not on file   Social History Narrative    Not on file     Social Determinants of Health     Financial Resource Strain: Not on file   Food Insecurity: Not on file   Transportation Needs: Not on file   Physical Activity: Not on file   Stress: Not on file   Social Connections: Not on file   Intimate Partner Violence: Not on file   Housing Stability: Not on file      Family History   Problem Relation Age of Onset    Deep vein thrombosis Mother     Multiple sclerosis Mother     Hypertension " Father      Past Surgical History:   Procedure Laterality Date    BACK SURGERY      COLONOSCOPY      HERNIA REPAIR Right     inguinal    IVC FILTER INSERTION      JOINT REPLACEMENT      l tkr and r thr    LARYNGOSCOPY      LUMBAR SPINE SURGERY      CT AMPUTATION TOE INTERPHALANGEAL JOINT Left 4/25/2017    Procedure: 2ND TOE AMPUTATION;  Surgeon: Romulo Frost DPM;  Location:  MAIN OR;  Service: Podiatry    CT OPEN TX PHALANGEAL SHAFT FRACTURE PROX/MIDDLE EA Left 3/28/2017    Procedure: OPEN REDUCTION W/ INTERNAL FIXATION (ORIF)PROXIMAL PHALANX RING FINGER;  Surgeon: Teto Foley MD;  Location: [facility];  Service: Orthopedics       Current Outpatient Medications:     acetaminophen (TYLENOL) 325 mg tablet, TAKE THREE TABLETS BY MOUTH EVERY 8 HOURS, Disp: , Rfl:     amitriptyline (ELAVIL) 150 MG tablet, Take 150 mg by mouth daily, Disp: , Rfl:     aspirin 81 MG tablet, Take 81 mg by mouth daily, Disp: , Rfl:     atorvastatin (LIPITOR) 20 mg tablet, Take 20 mg by mouth daily, Disp: , Rfl:     carvedilol (COREG) 6.25 mg tablet, TAKE 1 TABLET BY MOUTH TWICE A DAY WITH MEALS, Disp: 180 tablet, Rfl: 3    Cholecalciferol (VITAMIN D3) 1000 units CAPS, Take by mouth, Disp: , Rfl:     cyanocobalamin (VITAMIN B-12) 1,000 mcg tablet, Take 1,000 mcg by mouth daily, Disp: , Rfl:     diclofenac (VOLTAREN) 75 mg EC tablet, , Disp: , Rfl:     DULoxetine (CYMBALTA) 60 mg delayed release capsule, , Disp: , Rfl:     Eliquis 2.5 MG, Take 1 tablet by mouth, Disp: , Rfl:     folic acid (FOLVITE) 1 mg tablet, Take 1 mg by mouth daily, Disp: , Rfl:     furosemide (LASIX) 40 mg tablet, Take 1 tablet (40 mg total) by mouth daily, Disp: 30 tablet, Rfl: 6    gabapentin (NEURONTIN) 800 mg tablet, Take 800 mg by mouth 2 (two) times a day, Disp: , Rfl:     HYDROcodone-acetaminophen (NORCO) 5-325 mg per tablet, Take 1 tablet by mouth every 4 (four) hours as needed, Disp: , Rfl:     levothyroxine 50 mcg tablet, Take 50 mcg by mouth daily,  Disp: , Rfl:     Lidocaine-Menthol 4-5 % PTCH, Apply topically, Disp: , Rfl:     lisinopril (ZESTRIL) 40 mg tablet, Take 40 mg by mouth daily, Disp: , Rfl:     magnesium Oxide (MAG-OX) 400 mg TABS, Take 400 mg by mouth 2 (two) times a day, Disp: , Rfl:     melatonin 3 mg, every 24 hours, Disp: , Rfl:     metFORMIN (GLUCOPHAGE) 1000 MG tablet, Take 1,000 mg by mouth 2 (two) times a day with meals, Disp: , Rfl:     nicotine (NICODERM CQ) 21 mg/24 hr TD 24 hr patch, Place on the skin, Disp: , Rfl:     oxyCODONE (ROXICODONE) 5 immediate release tablet, TAKE ONE (1) TABLET BY MOUTH EVERY 4 HOURS FOR SEVERE PAIN, Disp: , Rfl:     albuterol (Ventolin HFA) 90 mcg/act inhaler, Inhale 1-2 puffs every 6 (six) hours as needed for wheezing or shortness of breath (Patient not taking: Reported on 10/11/2023), Disp: 8 g, Rfl: 0    clotrimazole-betamethasone (LOTRISONE) 1-0.05 % cream, Apply topically (Patient not taking: Reported on 4/5/2024), Disp: , Rfl:     mupirocin (BACTROBAN) 2 % ointment, Apply topically daily Left third toe (Patient not taking: Reported on 4/5/2024), Disp: 22 g, Rfl: 1    mupirocin (BACTROBAN) 2 % ointment, Apply topically daily (Patient not taking: Reported on 4/5/2024), Disp: 22 g, Rfl: 0    traMADol (ULTRAM) 50 mg tablet, , Disp: , Rfl:       Review of Systems:  Review of Systems   Constitutional:  Negative for appetite change, chills, diaphoresis, fatigue and fever.   Respiratory:  Negative for cough, chest tightness and shortness of breath.    Cardiovascular:  Negative for chest pain, palpitations and leg swelling.   Gastrointestinal:  Negative for diarrhea, nausea and vomiting.   Endocrine: Negative for cold intolerance and heat intolerance.   Genitourinary:  Negative for difficulty urinating, dysuria and enuresis.   Musculoskeletal:  Negative for arthralgias, back pain and gait problem.   Allergic/Immunologic: Negative for environmental allergies and food allergies.   Neurological:  Negative for  dizziness, facial asymmetry and headaches.   Hematological:  Negative for adenopathy. Does not bruise/bleed easily.   Psychiatric/Behavioral:  Negative for agitation, behavioral problems and confusion.          Physical Exam:  Physical Exam  Constitutional:       Appearance: He is well-developed.   HENT:      Right Ear: External ear normal.      Left Ear: External ear normal.   Eyes:      Extraocular Movements: EOM normal.      Pupils: Pupils are equal, round, and reactive to light.   Cardiovascular:      Rate and Rhythm: Normal rate and regular rhythm.      Heart sounds: Normal heart sounds. No murmur heard.     No friction rub. No gallop.   Pulmonary:      Effort: Pulmonary effort is normal.      Breath sounds: Normal breath sounds.   Abdominal:      Palpations: Abdomen is soft.   Musculoskeletal:         General: Normal range of motion.      Cervical back: Normal range of motion.   Skin:     General: Skin is warm and dry.   Neurological:      Mental Status: He is alert and oriented to person, place, and time.      Deep Tendon Reflexes: Reflexes are normal and symmetric.   Psychiatric:         Mood and Affect: Mood and affect normal.         Behavior: Behavior normal.         Thought Content: Thought content normal.         Judgment: Judgment normal.         This note was completed in part utilizing [employer] Direct Software.  Grammatical errors, random word insertions, spelling mistakes, and incomplete sentences can be an occasional consequence of this system secondary to software limitations, ambient noise, and hardware issues.  If you have any questions or concerns about the content, text, or information contained within the body of this dictation, please contact the provider for clarification.

## 2024-10-10 ENCOUNTER — OFFICE VISIT (OUTPATIENT)
Dept: PODIATRY | Facility: CLINIC | Age: 69
End: 2024-10-10
Payer: COMMERCIAL

## 2024-10-10 VITALS
WEIGHT: 220 LBS | HEART RATE: 62 BPM | BODY MASS INDEX: 31.5 KG/M2 | DIASTOLIC BLOOD PRESSURE: 65 MMHG | SYSTOLIC BLOOD PRESSURE: 105 MMHG | HEIGHT: 70 IN

## 2024-10-10 DIAGNOSIS — B35.1 ONYCHOMYCOSIS: Primary | ICD-10-CM

## 2024-10-10 DIAGNOSIS — I70.213 ATHEROSCLEROSIS OF NATIVE ARTERIES OF EXTREMITIES WITH INTERMITTENT CLAUDICATION, BILATERAL LEGS (HCC): Primary | ICD-10-CM

## 2024-10-10 DIAGNOSIS — L84 CORNS AND CALLUS: ICD-10-CM

## 2024-10-10 DIAGNOSIS — E11.40 TYPE 2 DIABETES MELLITUS WITH DIABETIC NEUROPATHY, WITHOUT LONG-TERM CURRENT USE OF INSULIN (HCC): ICD-10-CM

## 2024-10-10 DIAGNOSIS — E78.2 MIXED HYPERLIPIDEMIA: ICD-10-CM

## 2024-10-10 PROCEDURE — 11056 PARNG/CUTG B9 HYPRKR LES 2-4: CPT | Performed by: PODIATRIST

## 2024-10-10 PROCEDURE — 11721 DEBRIDE NAIL 6 OR MORE: CPT | Performed by: PODIATRIST

## 2024-10-10 RX ORDER — ATORVASTATIN CALCIUM 20 MG/1
20 TABLET, FILM COATED ORAL DAILY
Qty: 90 TABLET | Refills: 1 | Status: SHIPPED | OUTPATIENT
Start: 2024-10-10

## 2024-10-10 NOTE — TELEPHONE ENCOUNTER
Atorvastatin to [facility]  Lipids are current from PCP's office, see note on 3/13/24 OV.  Next OV scheduled for 11/2024.

## 2024-10-11 DIAGNOSIS — I25.5 ISCHEMIC CARDIOMYOPATHY: ICD-10-CM

## 2024-10-11 RX ORDER — CARVEDILOL 6.25 MG/1
6.25 TABLET ORAL 2 TIMES DAILY WITH MEALS
Qty: 180 TABLET | Refills: 0 | Status: SHIPPED | OUTPATIENT
Start: 2024-10-11

## 2024-10-11 NOTE — TELEPHONE ENCOUNTER
Reason for call:   [x] Refill   [] Prior Auth  [] Other:     Office:   [] PCP/Provider -   [x] Specialty/Provider -       carvedilol (COREG) 6.25 mg tablet       Quantity: 90    [facility]    Does the patient have enough for 3 days?   [] Yes   [x] No - Send as HP to POD

## 2024-10-12 NOTE — PROGRESS NOTES
PATIENT:  Long Mi  1955    ASSESSMENT:  1. Onychomycosis        2. Corns and callus        3. Type 2 diabetes mellitus with diabetic neuropathy, without long-term current use of insulin (HCC)                No orders of the defined types were placed in this encounter.         PLAN:  Disease prevention and related risk factors of diabetes were identified and discussed.    The patient was educated in proper foot wear for diabetics.    Educated in daily foot assessment and routine diabetic foot care.    Discussed the importance of controlling BS through diet and exercise.    The patient will follow up in 12 weeks for further diabetic foot exam and care.  Rx DM shoes which are medically necessary to accommodate the Charcot deformity of his right foot and prevent recurrent plantar ulcerations.    Procedures: 32249, 96157  All mycotic toenails were reduced and debrided in length, width, and girth using a nail nipper and electric dremel.    All hyperkeratotic skin lesion(s) if present were sharply pared with a #10 scalpel with no evidence of ulceration/abscess.    Patient tolerated procedure(s) well without complications.    Procedures     HPI:  Long Mi is a 69 y.o.year old male seen for diabetic foot exam.  The patient has class findings with diabetes and chronic Charcot breakdown right foot.   Request prescription for new diabetic shoes to accommodate his right foot deformity.  Current shoes are worn.  BS is under control.  The patient complained of thick toenails painful lesions.   Since last seen in April the patient sustained a left femur fracture went through ORIF developed a postop infection had a second surgery and now uses a wheelchair and walker to get around.  Denied any acute pedal disorder, redness, acute swelling, or recent injury.      The following portions of the patient's history were reviewed and updated as appropriate: allergies, current medications, past family history, past  "medical history, past social history, past surgical history and problem list.    REVIEW OF SYSTEMS:  GENERAL: No fever or chills  HEART: No chest pain, or palpitation  RESPIRATORY:  No acute SOB or cough  GI: No Nausea, vomit or diarrhea  NEUROLOGIC: No syncope or acute weakness    PHYSICAL EXAM:    /65 (BP Location: Left arm, Patient Position: Sitting, Cuff Size: Adult)   Pulse 62   Ht 5' 10\" (1.778 m) Comment: stated  Wt 99.8 kg (220 lb)   BMI 31.57 kg/m²     VASCULAR EXAM  Posterior tibial artery absent bilateral  Dorsalis pedis artery +1 bilateral  The patient has class findings with skin atrophy, lack of digital hair, and nail dystrophy.    There is no lower extremity edema bilaterally.      NEUROLOGIC EXAM  Sensation is intact to light touch. No   Sensation is absent to 10gm monofilament.  Vibratory sensation absent.     Tingling/numb paresthesias noted bilateral.         DERMATOLOGIC EXAM:   Texture, Tone and Turgor are diminished bilateral.  The patient has dystrophic/hypertrophic toenails with yellow/white discoloration, onycholysis, and subungal debris.   Fungal odor noted.  Brittle nature noted.  Right foot nails dystrophic x5 with 0.40 cm ave thickness (1 through 5)  Left foot nails dystrophic x4 with 0.45 cm ave thickness (1, 345)  Patient has hyperkeratotic lesions noted:  Right foot located at none.  Left foot located at distal tip 3rd toe, and left hallux IPJ plantar medial  No notable suspicious skin lesions.      MUSCULOSKELETAL EXAM:   No acute joint pain, edema, or redness.  No acute musculoskeletal problem.    Patient has moderate Charcot deformity right midfoot with extremely prominent plantar cuboid.    Patient has mild ambulation limitations and is going to PT.      Patient wears DM shoes? Yes    Risk Category/Class Findings:  Q7(Amp left #2 Toe)  1 = Loss of protective sensation    A1)  Has the patient had a previous amputation of the foot or integral skeletal portion thereof? Yes " (left #2 toe amputation)  B1)  Does the patient have absent posterior tibial pulse? Yes  B3)  Does the patient have absent dorsalis pedis? No  B2)  Does the patient have three of the following? Yes           1.  Hair growth (increased or decreased)  C)  Does the patient have two of the following and one above? Yes            3.  Edema, 4.  Paraesthesias (abnormal spontaneous sensations in the feet) and 5.  Burning

## 2024-11-06 DIAGNOSIS — I25.5 ISCHEMIC CARDIOMYOPATHY: ICD-10-CM

## 2024-11-06 RX ORDER — CARVEDILOL 6.25 MG/1
6.25 TABLET ORAL 2 TIMES DAILY WITH MEALS
Qty: 180 TABLET | Refills: 1 | Status: SHIPPED | OUTPATIENT
Start: 2024-11-06

## 2025-02-04 ENCOUNTER — TELEPHONE (OUTPATIENT)
Dept: CARDIOLOGY CLINIC | Facility: CLINIC | Age: 70
End: 2025-02-04

## 2025-02-04 NOTE — TELEPHONE ENCOUNTER
----- Message from Coty BANKS sent at 2/4/2025  2:52 PM EST -----  Patient requesting the forwarding of records to his new cardiologist.  Please call patient @ [phone]

## 2025-02-04 NOTE — TELEPHONE ENCOUNTER
Called pt. New cardiologist is [facility] cardiology. Fax # [phone]. Will fax LOV and recent testing as courtesy.

## 2025-02-14 ENCOUNTER — OFFICE VISIT (OUTPATIENT)
Dept: PODIATRY | Facility: CLINIC | Age: 70
End: 2025-02-14
Payer: COMMERCIAL

## 2025-02-14 VITALS — WEIGHT: 220 LBS | HEIGHT: 70 IN | BODY MASS INDEX: 31.5 KG/M2

## 2025-02-14 DIAGNOSIS — Z89.422 S/P AMPUTATION OF LESSER TOE, LEFT (HCC): ICD-10-CM

## 2025-02-14 DIAGNOSIS — E11.40 TYPE 2 DIABETES MELLITUS WITH DIABETIC NEUROPATHY, WITHOUT LONG-TERM CURRENT USE OF INSULIN (HCC): ICD-10-CM

## 2025-02-14 DIAGNOSIS — E11.610 DIABETIC CHARCOT FOOT (HCC): ICD-10-CM

## 2025-02-14 DIAGNOSIS — B35.1 ONYCHOMYCOSIS: Primary | ICD-10-CM

## 2025-02-14 PROCEDURE — 99212 OFFICE O/P EST SF 10 MIN: CPT | Performed by: PODIATRIST

## 2025-02-14 PROCEDURE — 11721 DEBRIDE NAIL 6 OR MORE: CPT | Performed by: PODIATRIST

## 2025-02-14 RX ORDER — DOXYCYCLINE 100 MG/1
100 CAPSULE ORAL 2 TIMES DAILY
COMMUNITY
Start: 2024-10-02

## 2025-02-14 NOTE — PROGRESS NOTES
PATIENT:  Long Mi  1955    ASSESSMENT:  1. Onychomycosis        2. Type 2 diabetes mellitus with diabetic neuropathy, without long-term current use of insulin ([facility])  Diabetic Shoe      3. Diabetic Charcot foot (HCC)  Diabetic Shoe    Right foot with central midfoot collapse      4. S/P amputation of lesser toe, left ([facility])  Diabetic Shoe    Second toe                Orders Placed This Encounter   Procedures   • Diabetic Shoe          PLAN:  Disease prevention and related risk factors of diabetes were identified and discussed.    The patient was educated in proper foot wear for diabetics.    Educated in daily foot assessment and routine diabetic foot care.    Discussed the importance of controlling BS through diet and exercise.    The patient will follow up in 12 weeks for further diabetic foot exam and care.  Rx custom DM shoes which are medically necessary to accommodate the Charcot deformity of his right foot and prevent recurrent plantar ulcerations.    Procedures: 24561  All mycotic toenails were reduced and debrided in length, width, and girth using a nail nipper and electric dremel.      Patient tolerated procedure(s) well without complications.    Procedures     HPI:  Long Mi is a 69 y.o.year old male seen for diabetic foot exam.  The patient has class findings with diabetes and chronic Charcot breakdown right foot.   Request prescription for new diabetic shoes to accommodate his right foot deformity.  Current shoes are worn.  BS is under control.  The patient complained of thick toenails painful lesions.   Since last seen in April the patient sustained a left femur fracture went through ORIF developed a postop infection had a second surgery and now uses a walker to get around.  Denies any acute pedal disorder, redness, acute swelling, or recent injury.      The following portions of the patient's history were reviewed and updated as appropriate: allergies, current medications, past  "family history, past medical history, past social history, past surgical history and problem list.    REVIEW OF SYSTEMS:  GENERAL: No fever or chills  HEART: No chest pain, or palpitation  RESPIRATORY:  No acute SOB or cough  GI: No Nausea, vomit or diarrhea  NEUROLOGIC: No syncope or acute weakness    PHYSICAL EXAM:    Ht 5' 10\" (1.778 m) Comment: stated  Wt 99.8 kg (220 lb)   BMI 31.57 kg/m²     VASCULAR EXAM  Posterior tibial artery absent bilateral  Dorsalis pedis artery +1 bilateral  The patient has class findings with skin atrophy, lack of digital hair, and nail dystrophy.    There is no lower extremity edema bilaterally.      NEUROLOGIC EXAM  Sensation is intact to light touch. No   Sensation is absent to 10gm monofilament.  Vibratory sensation absent.     Tingling/numb paresthesias noted bilateral.         DERMATOLOGIC EXAM:   Texture, Tone and Turgor are diminished bilateral.  The patient has dystrophic/hypertrophic toenails with yellow/white discoloration, onycholysis, and subungal debris.   Fungal odor noted.  Brittle nature noted.  Right foot nails dystrophic x5 with 0.40 cm ave thickness (1 through 5)  Left foot nails dystrophic x4 with 0.45 cm ave thickness (1, 345)  Patient has hyperkeratotic lesions noted:  Right foot located at none.  Left foot located at none  Hyperkeratotic lesions previously present have resolved.  No notable suspicious skin lesions.      MUSCULOSKELETAL EXAM:   No acute joint pain, edema, or redness.  No acute musculoskeletal problem.    Patient has moderate Charcot deformity right midfoot with extremely prominent plantar cuboid.    Patient has mild ambulation limitations and is going to PT.      Patient wears DM shoes? Yes    Risk Category/Class Findings:  Q7(Amp left #2 Toe)  1 = Loss of protective sensation    A1)  Has the patient had a previous amputation of the foot or integral skeletal portion thereof? Yes (left #2 toe amputation)  B1)  Does the patient have absent " posterior tibial pulse? Yes  B3)  Does the patient have absent dorsalis pedis? No  B2)  Does the patient have three of the following? Yes           1.  Hair growth (increased or decreased)  C)  Does the patient have two of the following and one above? Yes            3.  Edema, 4.  Paraesthesias (abnormal spontaneous sensations in the feet) and 5.  Burning

## 2025-03-13 ENCOUNTER — TELEPHONE (OUTPATIENT)
Age: 70
End: 2025-03-13

## 2025-05-16 ENCOUNTER — PROCEDURE VISIT (OUTPATIENT)
Dept: PODIATRY | Facility: CLINIC | Age: 70
End: 2025-05-16
Payer: COMMERCIAL

## 2025-05-16 VITALS — WEIGHT: 220 LBS | HEIGHT: 70 IN | BODY MASS INDEX: 31.5 KG/M2

## 2025-05-16 DIAGNOSIS — E11.40 TYPE 2 DIABETES MELLITUS WITH DIABETIC NEUROPATHY, WITHOUT LONG-TERM CURRENT USE OF INSULIN (HCC): ICD-10-CM

## 2025-05-16 DIAGNOSIS — B35.1 ONYCHOMYCOSIS: Primary | ICD-10-CM

## 2025-05-16 PROCEDURE — 11721 DEBRIDE NAIL 6 OR MORE: CPT | Performed by: PODIATRIST

## 2025-05-16 NOTE — PROGRESS NOTES
PATIENT:  Long Mi  1955    ASSESSMENT:  1. Onychomycosis        2. Type 2 diabetes mellitus with diabetic neuropathy, without long-term current use of insulin (HCC)          No orders of the defined types were placed in this encounter.    PLAN:  Disease prevention and related risk factors of diabetes were identified and discussed.    The patient was educated in proper foot wear for diabetics.    Educated in daily foot assessment and routine diabetic foot care.    Discussed the importance of controlling BS through diet and exercise.    The patient will follow up in 12 weeks for further diabetic foot exam and care.  Rx custom DM shoes which are medically necessary to accommodate the Charcot deformity of his right foot and prevent recurrent plantar ulcerations.    Procedures: 07189  All mycotic toenails were reduced and debrided in length, width, and girth using a nail nipper and electric dremel.      Patient tolerated procedure(s) well without complications.    Procedures     HPI:  Long Mi is a 69 y.o.year old male seen for diabetic foot exam.  The patient has class findings with diabetes and chronic Charcot breakdown right foot.   Request prescription for new diabetic shoes to accommodate his right foot deformity.  Current shoes are worn.  BS is under control.  The patient complained of thick toenails painful lesions.   Since last seen in April the patient sustained a left femur fracture went through ORIF developed a postop infection had a second surgery and now uses a walker to get around.  Denies any acute pedal disorder, redness, acute swelling, or recent injury.      The following portions of the patient's history were reviewed and updated as appropriate: allergies, current medications, past family history, past medical history, past social history, past surgical history and problem list.    REVIEW OF SYSTEMS:  GENERAL: No fever or chills  HEART: No chest pain, or  "palpitation  RESPIRATORY:  No acute SOB or cough  GI: No Nausea, vomit or diarrhea  NEUROLOGIC: No syncope or acute weakness    PHYSICAL EXAM:    Ht 5' 10\" (1.778 m) Comment: stated  Wt 99.8 kg (220 lb)   BMI 31.57 kg/m²     VASCULAR EXAM  Posterior tibial artery absent bilateral  Dorsalis pedis artery +1 bilateral  The patient has class findings with skin atrophy, lack of digital hair, and nail dystrophy.    There is no lower extremity edema bilaterally.      NEUROLOGIC EXAM  Sensation is intact to light touch. No   Sensation is absent to 10gm monofilament.  Vibratory sensation absent.     Tingling/numb paresthesias noted bilateral.         DERMATOLOGIC EXAM:   Texture, Tone and Turgor are diminished bilateral.  The patient has dystrophic/hypertrophic toenails with yellow/white discoloration, onycholysis, and subungal debris.   Fungal odor noted.  Brittle nature noted.  Right foot nails dystrophic x5 with 0.40 cm ave thickness (1 through 5)  Left foot nails dystrophic x4 with 0.45 cm ave thickness (1, 345)  Patient has hyperkeratotic lesions noted:  Right foot located at none.  Left foot located at none  Hyperkeratotic lesions previously present have resolved.  No notable suspicious skin lesions.      MUSCULOSKELETAL EXAM:   No acute joint pain, edema, or redness.  No acute musculoskeletal problem.    Patient has moderate Charcot deformity right midfoot with extremely prominent plantar cuboid.    Patient has mild ambulation limitations and is going to PT.      Patient wears DM shoes? Yes    Risk Category/Class Findings:  Q7(Amp left #2 Toe)  1 = Loss of protective sensation    A1)  Has the patient had a previous amputation of the foot or integral skeletal portion thereof? Yes (left #2 toe amputation)  B1)  Does the patient have absent posterior tibial pulse? Yes  B3)  Does the patient have absent dorsalis pedis? No  B2)  Does the patient have three of the following? Yes           1.  Hair growth (increased or " decreased)  C)  Does the patient have two of the following and one above? Yes            3.  Edema, 4.  Paraesthesias (abnormal spontaneous sensations in the feet) and 5.  Burning

## (undated) DEVICE — OCCLUSIVE GAUZE STRIP,3% BISMUTH TRIBROMOPHENATE IN PETROLATUM BLEND: Brand: XEROFORM

## (undated) DEVICE — GLOVE INDICATOR PI UNDERGLOVE SZ 8 BLUE

## (undated) DEVICE — SYRINGE 5ML LL

## (undated) DEVICE — SUT ETHILON 4-0 PS-2 18 IN 1667H

## (undated) DEVICE — ACE WRAP 4 IN UNSTERILE

## (undated) DEVICE — GAUZE SPONGES,16 PLY: Brand: CURITY

## (undated) DEVICE — BANDAGE, ESMARK LF STR 6"X9' (20/CS): Brand: CYPRESS

## (undated) DEVICE — GLOVE INDICATOR PI UNDERGLOVE SZ 7 BLUE

## (undated) DEVICE — 1.1MM DRILL BIT/MQC FOR THREADED HOLE/56MM

## (undated) DEVICE — 1.5MM CORTEX SCREW SLF-TPNG WITH T4 STARDRIVE RECESS 10MM: Type: IMPLANTABLE DEVICE | Site: FINGER | Status: NON-FUNCTIONAL

## (undated) DEVICE — CURITY STRETCH BANDAGE: Brand: CURITY

## (undated) DEVICE — INTENDED FOR TISSUE SEPARATION, AND OTHER PROCEDURES THAT REQUIRE A SHARP SURGICAL BLADE TO PUNCTURE OR CUT.: Brand: BARD-PARKER SAFETY BLADES SIZE 15, STERILE

## (undated) DEVICE — NEEDLE 18 G X 1 1/2

## (undated) DEVICE — ASTOUND STANDARD SURGICAL GOWN, XXL: Brand: CONVERTORS

## (undated) DEVICE — PADDING CAST 4 IN  COTTON STRL

## (undated) DEVICE — CHLORAPREP HI-LITE 26ML ORANGE

## (undated) DEVICE — GLOVE SRG BIOGEL 6.5

## (undated) DEVICE — POV-IOD SOLUTION 4OZ BT

## (undated) DEVICE — NEEDLE 25G X 1 1/2

## (undated) DEVICE — 20.0 MM X 5.8 MM X 0.60 MM SAGITTAL BLADE

## (undated) DEVICE — CUFF TOURNIQUET 18 X 4 IN QUICK CONNECT DISP 1 BLADDER

## (undated) DEVICE — SPONGE LAP 18 X 18 IN

## (undated) DEVICE — DRAPE C-ARM X-RAY

## (undated) DEVICE — GLOVE SRG BIOGEL 8

## (undated) DEVICE — CUFF TOURNIQUET 18 X 5.5 IN QUICK CONNECT 2BLA

## (undated) DEVICE — SPONGE PVP SCRUB WING STERILE

## (undated) DEVICE — ACE WRAP 3 IN STERILE

## (undated) DEVICE — REM POLYHESIVE ADULT PATIENT RETURN ELECTRODE: Brand: VALLEYLAB

## (undated) DEVICE — STERILE SLQ FOOT ANKLE PACK: Brand: CARDINAL HEALTH

## (undated) DEVICE — 10FR FRAZIER SUCTION HANDLE: Brand: CARDINAL HEALTH

## (undated) DEVICE — ADHESIVE SKN CLSR HISTOACRYL FLEX 0.5ML LF

## (undated) DEVICE — CURITY NON-ADHERENT STRIPS: Brand: CURITY

## (undated) DEVICE — 2000CC GUARDIAN II: Brand: GUARDIAN

## (undated) DEVICE — GLOVE SRG BIOGEL 9

## (undated) DEVICE — GLOVE SRG BIOGEL 7.5

## (undated) DEVICE — PACK PLASTIC HAND PBDS

## (undated) DEVICE — DISPOSABLE EQUIPMENT COVER: Brand: SMALL TOWEL DRAPE